# Patient Record
Sex: FEMALE | Race: WHITE | Employment: FULL TIME | URBAN - METROPOLITAN AREA
[De-identification: names, ages, dates, MRNs, and addresses within clinical notes are randomized per-mention and may not be internally consistent; named-entity substitution may affect disease eponyms.]

---

## 2021-05-07 ENCOUNTER — OFFICE VISIT (OUTPATIENT)
Dept: SURGERY | Age: 31
End: 2021-05-07
Payer: OTHER GOVERNMENT

## 2021-05-07 VITALS
OXYGEN SATURATION: 98 % | DIASTOLIC BLOOD PRESSURE: 80 MMHG | HEIGHT: 65 IN | HEART RATE: 114 BPM | SYSTOLIC BLOOD PRESSURE: 138 MMHG | TEMPERATURE: 97.6 F | BODY MASS INDEX: 48.82 KG/M2 | RESPIRATION RATE: 18 BRPM | WEIGHT: 293 LBS

## 2021-05-07 DIAGNOSIS — E66.01 MORBID OBESITY WITH BMI OF 60.0-69.9, ADULT (HCC): Primary | ICD-10-CM

## 2021-05-07 PROCEDURE — 99205 OFFICE O/P NEW HI 60 MIN: CPT | Performed by: SURGERY

## 2021-05-07 RX ORDER — FERROUS GLUCONATE 324(38)MG
324 TABLET ORAL DAILY
COMMUNITY
End: 2021-08-17

## 2021-05-07 RX ORDER — LEVOTHYROXINE SODIUM 125 UG/1
2 CAPSULE ORAL DAILY
COMMUNITY
Start: 2021-03-03

## 2021-05-07 NOTE — PROGRESS NOTES
Consult    Patient: Beulah Marquez MRN: [de-identified]  SSN: [id number]    YOB: 1990  Age: 27 y.o. Sex: female      Initial  Consultation for Bariatric Surgery     Beulah Marquez is a 74-year-old Y female who presents for discussion of the surgical options available for definitive management of her super, super obesity. Onset obesity: Childhood  Weight at age 24: 240 pounds on 5 four 5 inch frame  Maximum/current weight: 363 pounds  Pattern/progression of weight gain: Slowly progressive interrupted by dietary weight loss followed by regain of lost weight as well as additional weight thus exhibiting yoyo effect after current maximum weight of 363 pounds. Next medical weight loss attempts: Multiple supervised and and supervised weight loss trials with maximal loss occurring in 2019 losing 20 pounds over 3 months  Comorbidities: Prediabetes, Gastrosoft reflux disease, stricture incontinence, clinical obstructive sleep apnea, weight related arthropathyknees  Current weight: 363 pounds on a 5.5 inch frame with a body mass index of 61  Ideal body weight: 34  Excess body weight: 233  Postsurgical weight loss: 97  Estimated postsurgical goal weight: 180  Allergies: Latex, penicillin  Current medications: See medication list  Past medical history:  1. Super, super obesity with body mass index of 61 with obesity related comorbidities of prediabetes, Gastrosoft reflux disease, stricture incontinence, clinical obstructive sleep apnea, and related arthropathyknees  2. History of Hashimoto's thyroiditis at age 24 now hypothyroid  Past surgical history:  1. Tonsillectomy age 16  2. Allons tooth extraction 2019  Social history:  Denies utilization tobacco  Alcohol: Approximately 2 ounces monthly  Family history:   Mother 53adult onset diabetes mellitus,  Father 57healthy  Siblings x3healthy    Allergies   Allergen Reactions    Latex Itching    Pcn [Penicillins] Anaphylaxis       Current Outpatient Medications on File Prior to Visit   Medication Sig Dispense Refill    ferrous gluconate 324 mg (38 mg iron) tablet Take 324 mg by mouth.  Tirosint 125 mcg cap 2 Tabs daily. No current facility-administered medications on file prior to visit. Past Medical History:   Diagnosis Date    Borderline hypertension     Hypercholesterolemia     Prediabetes     Thyroid disease        Past Surgical History:   Procedure Laterality Date    HX HEENT      tonsillectomy       Social History     Tobacco Use    Smoking status: Never Smoker    Smokeless tobacco: Never Used   Substance Use Topics    Alcohol use: Yes     Frequency: Monthly or less    Drug use: Not Currently       Family History   Problem Relation Age of Onset    Diabetes Mother          Review of Systems:      General: Denies fevers, chills, night sweats, fatigue, weight loss, or weight gain. HEENT: Denies changes in auditory or visual acuity, recurrent pharyngitis, epistaxis, chronic rhinorrhea, vertigo    Respiratory: Denies increasing shortness of breath, productive cough, hemoptysis    Cardiac: Denies known history of cardiac disease, heart murmur, palpitations    GI: Denies dysphagia, recurrent emesis, hematemesis, changes in bowel habits, hematochezia, melena    : Denies hematuria frequency urgency dysuria    Musculoskeletal: Denies fractures, dislocations    Neurologic: Denies history of CVA, paralysis paresthesias, recurrent cephalgia, seizures    Endocrine: Denies polyuria, polydipsia, polyphagia, heat and cold intolerance    Lymph/heme: Denies a history of malignancy, anemia, bruising, blood transfusions    Integumentary: Negative for dermatitis         Physical Exam    Visit Vitals  /80   Pulse (!) 114   Temp 97.6 °F (36.4 °C)   Resp 18   Ht 5' 5\" (1.651 m)   Wt (!) 166.5 kg (367 lb)   SpO2 98%   BMI 61.07 kg/m²       Nursing note reviewed.      General: Clinically severely obese in no acute distress, nontoxic in appearance. Head: Normocephalic, atraumatic  Mouth: Clear, no overt lesions, oral mucosa is pink and moist.  Neck: Supple, no masses, no adenopathy or carotid bruits, trachea midline  Resp: Clear to auscultation bilaterally, no wheezing, rhonchi, or rales, excursions normal and symmetrical.  Cardio: Regular rate and rhythm, no murmurs, clicks, gallops, or rubs. Abdomen: Obese, soft, nontender, nondistended, normoactive bowel sounds, no hernias. Extremities: Warm, well perfused, no tenderness or swelling, normal gait/station, without edema or varicosities  Neuro: Sensation and strength grossly intact and symmetrical.  Psych: Alert and oriented to person, place, and time. Impression/Plan:    41-year-old white female with body mass index of 61 with obesity related comorbidities consisting of prediabetes, Gastrosoft reflux disease, stricture incontinence, clinical obstructive sleep apnea, weight related arthropathyknees who would benefit from bariatric surgery. We have had an extensive discussion with regard to the risks, benefits and likely outcomes of the operation. We've discussed the restrictive and malabsorptive nature of the gastric bypass and compared and contrasted with the sleeve gastrectomy. The patient understands the likelihood of losing approximately 80% of their excess weight in 12 to 18 months. The patient also understands the risks including but not limited to bleeding, infection, need for reoperation, ulcers, leaks and strictures, bowel obstruction secondary to adhesions and internal hernias, DVT, PE, heart attack, stroke, and death. Patient also understands risks of inadequate weight loss, excess weight loss, vitamin insufficiency, protein malnutrition, excess skin, and loss of hair.   We have reviewed the components of a successful postoperative course including requirement for a high protein, low carbohydrate diet, 60 oz a day of zero calorie liquids, daily vitamin supplementation, daily exercise, regular follow-up, and participation in support groups.  At this time we will enroll the patient in our bariatric program, undertake routine laboratory evaluation, chest X-ray, EKG, possible UGI and evaluation by  nutritionist as well as psychologist and pending their satisfactory completion of the preop evaluation, plan to pursue laparoscopic potentially open gastric bypass to achieve definitive durable weight loss on a personal level with expected resolution of obesity related comorbidities

## 2021-05-07 NOTE — PROGRESS NOTES
Chief Complaint   Patient presents with    Advice Only     confirmed video     Pt ID confirmed    Weight Loss Metrics 5/7/2021 5/7/2021   Pre op / Initial Wt 367 -   Today's Wt - 367 lb   BMI - 61.07 kg/m2   Ideal Body Wt 134 -   Excess Body Wt 233 -   Goal Wt 180 -   Wt loss to date 0 -   % Wt Loss 0 -   80% .4 -       Body mass index is 61.07 kg/m².

## 2021-05-11 ENCOUNTER — HOSPITAL ENCOUNTER (OUTPATIENT)
Dept: BARIATRICS/WEIGHT MGMT | Age: 31
Discharge: HOME | End: 2021-05-11

## 2021-05-11 ENCOUNTER — DOCUMENTATION ONLY (OUTPATIENT)
Dept: BARIATRICS/WEIGHT MGMT | Age: 31
End: 2021-05-11

## 2021-05-11 NOTE — PROGRESS NOTES
[address] Loss Madalyn JOHNATHAN Emmy [address] [facility], [address]    Patient's Name: Asha Parry   Age: 27 y.o. YOB: 1990   Sex: female    Date:   5/11/2021    Insurance:              Session: 1 of  3  Revision:     Surgeon:  Dr. Rojas Lerma    Height: 5 f 5   Weight:    367      Lbs. BMI: 61.2   Pounds Lost since last month: 0                 Pounds Gained since last month: 0    Starting Weight: 367     Previous Months Weight: 367  Overall Pounds Lost: 0   Overall Pounds Gained: 0    Do you smoke? None    Alcohol intake:  Number of drinks at a time:  1-3 glasses of wine a month  Number of times a week:     Class Guidelines    Guidelines are reviewed with patient at the start of every class. 1. Patient understands that weight loss trial classes must be consecutive. Patient understands if they miss a class, it is their responsibility to contact me to reschedule class. I will reach out to patient after their first no show. 2.  Patient understands the expectations that weight maintenance/weight loss is expected during the classes. Failure to demonstrate changes may result in one extra month of weight loss trial, followed by going back to see the surgeon. Patient understands that they CAN NOT gain any weight during the weight loss trial.  Gaining weight will result in extra classes. 3. Patient is also instructed to be doing their labs, blood work, psych visit, support group and any other test that the surgeon has used while they are working on their weight loss trial.  4.  Patient was instructed to bring their blue binder to every class and appointment. Other Pertinent Information:     Changes Made Since Last Class: n/a, first class    Eating Habits and Behaviors      Today in class we talked about the key diet principles.   We start off each class talking about these principles, which include cutting out liquid calories and focusing on water or other non-calorie, non-carbonated drinks. We also spent time talking about carbohydrates, including foods that have carbohydrates and the goal to keep daily carbohydrates under 100 grams per day. Patient was given ideas of meal and snack choices that are lower in carbohydrates and focus more on protein. Patient was encouraged to start trying protein shakes and was given a list of suggestions. The main topic of class today was: Portion Control. We reviewed in class a power point filled with tips on ways to control portions, including using smaller plates, boxing up portions at a restaurant before starting to eat, and not eating from the container, but rather portioning snacks into smaller bags. Patient's were encouraged to food journal, which helps increase awareness of what and how much they are eating. It was emphasized to patient the importance of reading labels and portion sizes, but also applying these portion sizes. Patient was given a list of items that can help to make portion control easier. For example, a deck of cards or a palm of a hand is a proper portion of meat, a fist is a cup or a proper serving of vegetables. Patient was given 10 tips to help with the portion control. Patient's current diet habits include: 2 meals per day. She states she is either skipping lunch or breakfast.  She states if she eats breakfast, it is typically something savory like dinner leftovers or soup. Lunch is fast food or chinese. DInner is some protein, meat, fried chicken, spaghetti. Patient states it is normally something from the [place]. \"  I have given her guidelines for carbohydrates and keeping those low. She states she is eating from a regular size plates. She states her portion of starchy foods are 2-4 cups depending on what it is. SHe states she is snacking on junk food all day. I have talked to her about these habits and have made suggestions for alternative snack options. She is drinking 36-72 ounces of soda per day. She is aware that is something she needs to discontinue. Physical Activity/Exercise    Comments:     Currently for exercise, patient is lifting weights 2 x a week, riding her bike for 30-60 minutes. She states she has completed in triathlons at this weight. .  Patient was given a list of ideas for activity and was encouraged to incorporate 30 minutes a day into their daily routine. Behavior Modification       Comments: In class, we also focused on the behavior aspects of weight management. This includes being a mindful eater and not eating in front of the TV. Patient is also encouraged to take 20 minutes to eat a meal and eat at a table. One goal for next month includes:  1. Cut out soda intake.        Claire Mancera RD  5/11/2021

## 2021-06-08 ENCOUNTER — HOSPITAL ENCOUNTER (OUTPATIENT)
Dept: BARIATRICS/WEIGHT MGMT | Age: 31
Discharge: HOME | End: 2021-06-08

## 2021-06-08 ENCOUNTER — DOCUMENTATION ONLY (OUTPATIENT)
Dept: BARIATRICS/WEIGHT MGMT | Age: 31
End: 2021-06-08

## 2021-06-08 NOTE — PROGRESS NOTES
[address] Loss Madalyn JOHNATHAN Emmy [address] [facility], [address]    Patient's Name: Binh Bee   Age: 27 y.o. YOB: 1990   Sex: female    Date:   6/8/2021    Insurance:            Session: 2 of  3  Revision:   Surgeon:  Dr. Lucila Harada    Height: 5 f 5 Weight:    356      Lbs. BMI: 59.3   Pounds Lost since last month: 11               Pounds Gained since last month: 0    Starting Weight: 367   Previous Months Weight: 367  Overall Pounds Lost: 11 Overall Pounds Gained: 0      Do you smoke? None    Alcohol intake:  Number of drinks at a time:  1-3 glasses of wine a month  Number of times a week:     Class Guidelines    Guidelines are reviewed with patient at the start of every class. 1. Patient understands that weight loss trial classes must be consecutive. Patient understands if they miss a class, it is their responsibility to contact me to reschedule class. I will reach out to patient after their first no show. 2.  Patient understands the expectations that weight maintenance/weight loss is expected during the classes. Failure to demonstrate changes may result in one extra month of weight loss trial, followed by going back to see the surgeon. Patient understands that they CAN NOT gain any weight during the weight loss trial.  Gaining weight will result in extra classes. 3. Patient is also instructed to be doing their labs, blood work, psych visit, support group and any other test that the surgeon has used while they are working on their weight loss trial.  4.  Patient was instructed to bring their blue binder to every class and appointment. Other Pertinent Information:     Changes Made Since Last Class: Patient states her biggest victory is that she stopped drinking soda. Eating smaller portions. Decreased carbohydrates and using protein instead.     Eating Habits and Behaviors    Today in class, I reviewed a power point that discussed Nutrition, Behavior, and Exercise changes to start working on. Some of the eating behaviors that we discussed included the importance of eating breakfast.  We talked about some of the reasons that people don't eat breakfast and food choices that would be appropriate. We also talked about avoiding liquid calories. Patient is encouraged to aim for 64 ounces of fluid per day and trying to get them from water, Crystal Light, sugar free drinks. We also talked about eating out options that are healthier. Patient was encouraged to always request sauces and dressings on the side and request a salad, broth-based soup, or vegetables in place of fries. Patient's current diet habits include: Patient is eating 3-4 x a day. She is grabbing a protein shake in the morning or a yogurt. Sometimes she may have eggs or turkey ojeda. LUnch is deli meat or low fat cottage cheese. Dinner is non starchy vegetables, full serving of protein. She states if she does snack, it is between lunch and dinner and may be peanut butter or vegetables with hummus. She states if she eats out, she is trying to split with her . She is drinking  ounces of water per day. Physical Activity/Exercise    Comments: We talked about exercise. Patient was given reasons of why exercise is so important and how that can help with their long-term success. I have encouraged patient to get a support system to help with the activity. Currently for activity, patient is lifting weights twice a week and doing yoga twice a week and riding her bike outside 30-60 minutes a few times a week. Behavior Modification       Comments: We also talked about behavior modifications. We talked about eating triggers, such as eating in front of the TV and solutions, such as making the TV a no eating zone. If patient is eating out out of emotion, food will only temporarily solve that.   Patient is encouraged to HALT and assess if they are eating because they are Hungry, or out of emotions: Anxious, Lonely, Tired, which the food will only temporarily solve. We also talked about ways to prevent relapse. Goals that patient wants to work on includes:  1. Meal prep for breakfast  2. Incorporate swimming into her activity.        Mayur Mayers 87 RD  6/8/2021

## 2021-07-07 ENCOUNTER — DOCUMENTATION ONLY (OUTPATIENT)
Dept: SURGERY | Age: 31
End: 2021-07-07

## 2021-07-07 ENCOUNTER — HOSPITAL ENCOUNTER (OUTPATIENT)
Dept: LAB | Age: 31
Discharge: HOME | End: 2021-07-07
Payer: OTHER GOVERNMENT

## 2021-07-07 DIAGNOSIS — E66.01 MORBID OBESITY WITH BMI OF 60.0-69.9, ADULT (HCC): ICD-10-CM

## 2021-07-07 PROCEDURE — 83013 H PYLORI (C-13) BREATH: CPT

## 2021-07-09 LAB — UREA BREATH TEST QL: NEGATIVE

## 2021-07-13 ENCOUNTER — HOSPITAL ENCOUNTER (OUTPATIENT)
Dept: BARIATRICS/WEIGHT MGMT | Age: 31
Discharge: HOME | End: 2021-07-13

## 2021-07-13 ENCOUNTER — DOCUMENTATION ONLY (OUTPATIENT)
Dept: BARIATRICS/WEIGHT MGMT | Age: 31
End: 2021-07-13

## 2021-07-13 NOTE — PROGRESS NOTES
[address] Loss Madalyn Booth [address]    Patient's Name: Tom Kenyon   Age: 27 y.o. YOB: 1990   Sex: female    Date:   7/13/2021    Insurance:              Session: 3 of  3  Revision:     Surgeon:  Dr. Gamaliel Crowley    Height: 5 f 5   Weight:    347      Lbs. BMI: 57.9   Pounds Lost since last month: 11                 Pounds Gained since last month: 0    Starting Weight: 367     Previous Months Weight: 356  Overall Pounds Lost: 20   Overall Pounds Gained: 0      Do you smoke? None    Alcohol intake:  Number of drinks at a time:  None  Number of times a week: None    Class Guidelines    Guidelines are reviewed with patient at the start of every class. 1. Patient understands that weight loss trial classes must be consecutive. Patient understands if they miss a class, it is their responsibility to contact me to reschedule class. I will reach out to patient after their first no show. 2.  Patient understands the expectations that weight maintenance/weight loss is expected during the classes. Failure to demonstrate changes may result in one extra month of weight loss trial, followed by going back to see the surgeon. Patient understands that they CAN NOT gain any weight during the weight loss trial.  Gaining weight will result in extra classes. 3. Patient is also instructed to be doing their labs, blood work, psych visit, support group and any other test that the surgeon has used while they are working on their weight loss trial.  4.  Patient was instructed to bring their blue binder to every class and appointment. Other Pertinent Information:     Changes Made Since Last Class: Cut out the starches and bread and crackers. Craving soda, but states she has not given into the cravings. Eating Habits and Behaviors    Today in class, we started talking about the key diet principles.   We first focused on stopping liquid calories. Patient was also educated on carbohydrates. Patient was instructed to start cutting out bread, rice, and pasta from the diet and start focusing more on meat and vegetables. I then gave a power point, which focused on Label Reading. In class, I gave patients a labels and we worked through a series of questions to help patients have a better understanding of label reading. Patient was instructed to review the serving size. Patient was encouraged to focus on protein and carbohydrates. We also did a few label reading activities to help the patient become more familiar with label reading. Patient's current diet habits include: Patient is eating 3 melas per day and 1 snack. She states she is trying to do more protein shakes, fruits, and vegetables. She states she is eating more protein at dinner time. She is snacking between lunch on peanut butter dip, vegetables with hummus or a protein bar. She states she has only eaten out 2 x in the last month. Physical Activity/Exercise    Comments: We talked about exercise. Patient was given reasons of why exercise is so important and how that can help with their long-term success. I have encouraged patient to get a support system to help with the activity. Currently for activity, patient is doing light weights twice a week. She is doing yoga 2 x a week. She is riding a bike 2 x a week. .      Behavior Modification       Comments:  Behavior modifications were reinforced. This included not eating in front of the TV, which could lead to bigger portions and eating when one is not hungry. We also talked about the importance of eating 3 meals per day. Patient was encouraged to food journal to keep their daily carbohydrates less than 30 grams per meal.      Goals that patient wants to work on includes:  1. Stay on the plan while on vacation.   [address], [facility] Billy 87 RD  7/13/2021

## 2021-07-16 ENCOUNTER — DOCUMENTATION ONLY (OUTPATIENT)
Dept: BARIATRICS/WEIGHT MGMT | Age: 31
End: 2021-07-16

## 2021-07-16 ENCOUNTER — HOSPITAL ENCOUNTER (OUTPATIENT)
Dept: LAB | Age: 31
Discharge: HOME | End: 2021-07-16
Payer: OTHER GOVERNMENT

## 2021-07-16 ENCOUNTER — HOSPITAL ENCOUNTER (OUTPATIENT)
Dept: BARIATRICS/WEIGHT MGMT | Age: 31
Discharge: HOME | End: 2021-07-16

## 2021-07-16 ENCOUNTER — HOSPITAL ENCOUNTER (OUTPATIENT)
Dept: GENERAL RADIOLOGY | Age: 31
Discharge: HOME | End: 2021-07-16
Payer: OTHER GOVERNMENT

## 2021-07-16 DIAGNOSIS — E66.01 MORBID OBESITY WITH BMI OF 60.0-69.9, ADULT (HCC): ICD-10-CM

## 2021-07-16 LAB
25(OH)D3 SERPL-MCNC: 20 NG/ML (ref 30–100)
ALBUMIN SERPL-MCNC: 3.8 G/DL (ref 3.4–5)
ALBUMIN/GLOB SERPL: 1.1 {RATIO} (ref 0.8–1.7)
ALP SERPL-CCNC: 89 U/L (ref 45–117)
ALT SERPL-CCNC: 45 U/L (ref 13–56)
ANION GAP SERPL CALC-SCNC: 8 MMOL/L (ref 3–18)
APPEARANCE UR: ABNORMAL
AST SERPL-CCNC: 56 U/L (ref 10–38)
ATRIAL RATE: 105 BPM
BACTERIA URNS QL MICRO: ABNORMAL /HPF
BILIRUB SERPL-MCNC: 0.6 MG/DL (ref 0.2–1)
BILIRUB UR QL: ABNORMAL
BUN SERPL-MCNC: 13 MG/DL (ref 7–18)
BUN/CREAT SERPL: 16 (ref 12–20)
CALCIUM SERPL-MCNC: 8.4 MG/DL (ref 8.5–10.1)
CALCULATED P AXIS, ECG09: 20 DEGREES
CALCULATED R AXIS, ECG10: -4 DEGREES
CALCULATED T AXIS, ECG11: 12 DEGREES
CHLORIDE SERPL-SCNC: 103 MMOL/L (ref 100–111)
CHOLEST SERPL-MCNC: 256 MG/DL
CO2 SERPL-SCNC: 26 MMOL/L (ref 21–32)
COLOR UR: ABNORMAL
CREAT SERPL-MCNC: 0.82 MG/DL (ref 0.6–1.3)
DIAGNOSIS, 93000: NORMAL
EPITH CASTS URNS QL MICRO: ABNORMAL /LPF (ref 0–5)
ERYTHROCYTE [DISTWIDTH] IN BLOOD BY AUTOMATED COUNT: 14.7 % (ref 11.6–14.5)
FERRITIN SERPL-MCNC: 122 NG/ML (ref 8–388)
FOLATE SERPL-MCNC: 18.5 NG/ML (ref 3.1–17.5)
GLOBULIN SER CALC-MCNC: 3.6 G/DL (ref 2–4)
GLUCOSE SERPL-MCNC: 129 MG/DL (ref 74–99)
GLUCOSE UR STRIP.AUTO-MCNC: NEGATIVE MG/DL
HCT VFR BLD AUTO: 43.4 % (ref 35–45)
HDLC SERPL-MCNC: 51 MG/DL (ref 40–60)
HDLC SERPL: 5 {RATIO} (ref 0–5)
HGB BLD-MCNC: 13.7 G/DL (ref 12–16)
HGB UR QL STRIP: ABNORMAL
IRON SERPL-MCNC: 51 UG/DL (ref 50–175)
KETONES UR QL STRIP.AUTO: 15 MG/DL
LDLC SERPL CALC-MCNC: 165.8 MG/DL (ref 0–100)
LEUKOCYTE ESTERASE UR QL STRIP.AUTO: ABNORMAL
LIPID PROFILE,FLP: ABNORMAL
MCH RBC QN AUTO: 26.8 PG (ref 24–34)
MCHC RBC AUTO-ENTMCNC: 31.6 G/DL (ref 31–37)
MCV RBC AUTO: 84.9 FL (ref 74–97)
NITRITE UR QL STRIP.AUTO: NEGATIVE
P-R INTERVAL, ECG05: 150 MS
PH UR STRIP: 5.5 [PH] (ref 5–8)
PLATELET # BLD AUTO: 420 K/UL (ref 135–420)
PMV BLD AUTO: 10.6 FL (ref 9.2–11.8)
POTASSIUM SERPL-SCNC: 4.2 MMOL/L (ref 3.5–5.5)
PROT SERPL-MCNC: 7.4 G/DL (ref 6.4–8.2)
PROT UR STRIP-MCNC: ABNORMAL MG/DL
Q-T INTERVAL, ECG07: 348 MS
QRS DURATION, ECG06: 86 MS
QTC CALCULATION (BEZET), ECG08: 459 MS
RBC # BLD AUTO: 5.11 M/UL (ref 4.2–5.3)
RBC #/AREA URNS HPF: ABNORMAL /HPF (ref 0–5)
SODIUM SERPL-SCNC: 137 MMOL/L (ref 136–145)
SP GR UR REFRACTOMETRY: 1.03 (ref 1–1.03)
TRIGL SERPL-MCNC: 196 MG/DL (ref ?–150)
TSH SERPL DL<=0.05 MIU/L-ACNC: 0.71 UIU/ML (ref 0.36–3.74)
UROBILINOGEN UR QL STRIP.AUTO: 1 EU/DL (ref 0.2–1)
VENTRICULAR RATE, ECG03: 105 BPM
VIT B12 SERPL-MCNC: 396 PG/ML (ref 211–911)
VLDLC SERPL CALC-MCNC: 39.2 MG/DL
WBC # BLD AUTO: 11.7 K/UL (ref 4.6–13.2)
WBC URNS QL MICRO: ABNORMAL /HPF (ref 0–4)

## 2021-07-16 PROCEDURE — 82306 VITAMIN D 25 HYDROXY: CPT

## 2021-07-16 PROCEDURE — 83540 ASSAY OF IRON: CPT

## 2021-07-16 PROCEDURE — 81001 URINALYSIS AUTO W/SCOPE: CPT

## 2021-07-16 PROCEDURE — 84425 ASSAY OF VITAMIN B-1: CPT

## 2021-07-16 PROCEDURE — 71046 X-RAY EXAM CHEST 2 VIEWS: CPT

## 2021-07-16 PROCEDURE — 36415 COLL VENOUS BLD VENIPUNCTURE: CPT

## 2021-07-16 PROCEDURE — 93005 ELECTROCARDIOGRAM TRACING: CPT

## 2021-07-16 PROCEDURE — 80053 COMPREHEN METABOLIC PANEL: CPT

## 2021-07-16 PROCEDURE — 80061 LIPID PANEL: CPT

## 2021-07-16 PROCEDURE — 84443 ASSAY THYROID STIM HORMONE: CPT

## 2021-07-16 PROCEDURE — 82728 ASSAY OF FERRITIN: CPT

## 2021-07-16 PROCEDURE — 82607 VITAMIN B-12: CPT

## 2021-07-16 PROCEDURE — 85027 COMPLETE CBC AUTOMATED: CPT

## 2021-07-16 NOTE — PROGRESS NOTES
Nutrition Evaluation    Patient's Name: Fay Ortiz   Age: 27 y.o. YOB: 1990   Sex: female    Height: 5 f 5 Weight: 345 BMI:  57.5  Starting Weight:  367        Smoking Status:  None  Alcohol Intake:  Number of Drinks at a Time: None  Number of Times a Week: None    Changes made during classes include:  Cut out her carbohydrates  Lowered her sugar intake  Increased her water intake  Walking    Summary:  I feel that Fay Ortiz has demonstrated appropriate diet changes and is ready to move forward with surgery. Patient has been briefed on the importance of the protein drinks, vitamins, and the transition of the diet stages. Patient understands that the long-term diet will focus on protein and vegetables. Patient understand the effects of carbohydrates after surgery and what reactive hypoglycemia is. Patient is aware that they will be attending pre-op class 2 weeks before surgery and will get more detailed information on the post-op diet guidelines. Patient will see me again at 6 weeks post-op. At this 6 week visit, RD will assess how patient is tolerating soft protein and advance to vegetables, if tolerating soft protein without difficulty. Patient will also see RD again at 9 months post-op. This visit will assess patient's compliance with current protocol, including diet, vitamins, protein shakes, and exercise. Post-op diet guidelines will be reinforced. RD is available for questions and to meet with patient outside of the 6 week and 9 month post-op visit. We spent a lot of time talking about the vitamins. Patient understands the importance of being compliant with the diet protocol and the complications and risks that can occur if they are non-compliant with the nutritional protocol. Patient has attended at least one support group.     Candidate for surgery: Yes  Re-evaluation Date:     Procedure:  Gastric [address], [place] RD  7/16/2021

## 2021-07-19 DIAGNOSIS — E66.01 MORBID OBESITY WITH BMI OF 60.0-69.9, ADULT (HCC): ICD-10-CM

## 2021-07-19 DIAGNOSIS — Z01.818 PRE-OP TESTING: Primary | ICD-10-CM

## 2021-07-20 ENCOUNTER — HOSPITAL ENCOUNTER (OUTPATIENT)
Dept: LAB | Age: 31
Discharge: HOME | End: 2021-07-20
Payer: OTHER GOVERNMENT

## 2021-07-20 ENCOUNTER — TELEPHONE (OUTPATIENT)
Dept: SURGERY | Age: 31
End: 2021-07-20

## 2021-07-20 DIAGNOSIS — Z01.818 PRE-OP TESTING: ICD-10-CM

## 2021-07-20 DIAGNOSIS — E66.01 MORBID OBESITY WITH BMI OF 60.0-69.9, ADULT (HCC): ICD-10-CM

## 2021-07-20 DIAGNOSIS — R82.90 ABNORMAL URINALYSIS: Primary | ICD-10-CM

## 2021-07-20 LAB
ATRIAL RATE: 86 BPM
CALCULATED P AXIS, ECG09: 49 DEGREES
CALCULATED R AXIS, ECG10: 45 DEGREES
CALCULATED T AXIS, ECG11: 25 DEGREES
DIAGNOSIS, 93000: NORMAL
P-R INTERVAL, ECG05: 168 MS
Q-T INTERVAL, ECG07: 356 MS
QRS DURATION, ECG06: 90 MS
QTC CALCULATION (BEZET), ECG08: 426 MS
VENTRICULAR RATE, ECG03: 86 BPM
VIT B1 BLD-SCNC: 138.7 NMOL/L (ref 66.5–200)

## 2021-07-20 PROCEDURE — 93005 ELECTROCARDIOGRAM TRACING: CPT

## 2021-07-22 ENCOUNTER — OFFICE VISIT (OUTPATIENT)
Dept: SURGERY | Age: 31
End: 2021-07-22
Payer: OTHER GOVERNMENT

## 2021-07-22 VITALS
DIASTOLIC BLOOD PRESSURE: 85 MMHG | HEIGHT: 65 IN | WEIGHT: 293 LBS | HEART RATE: 109 BPM | RESPIRATION RATE: 20 BRPM | BODY MASS INDEX: 48.82 KG/M2 | TEMPERATURE: 99.1 F | SYSTOLIC BLOOD PRESSURE: 146 MMHG

## 2021-07-22 DIAGNOSIS — E66.01 MORBID OBESITY WITH BMI OF 50.0-59.9, ADULT (HCC): Primary | ICD-10-CM

## 2021-07-22 PROCEDURE — 99214 OFFICE O/P EST MOD 30 MIN: CPT | Performed by: SURGERY

## 2021-07-22 RX ORDER — SPIRONOLACTONE 50 MG/1
TABLET, FILM COATED ORAL DAILY
COMMUNITY
End: 2021-08-17

## 2021-07-22 RX ORDER — GLUCOSAMINE SULFATE 1500 MG
POWDER IN PACKET (EA) ORAL DAILY
COMMUNITY
End: 2021-08-06

## 2021-07-22 NOTE — PROGRESS NOTES
Preop History and Physical Exam:    Prabhakar Ortiz is a 27 y.o. white female initially evaluated in this office on 7 May 2021 for discussion of the surgical options available for the definitive management of her clinically severe obesity. The patient at that time weighed 363 pounds with a body mass index of 61 with obesity related comorbidities of hypertriglyceridemia, hypercholesterolemia, prediabetes, gastroesophageal reflux disease, stress urinary incontinence, clinical obstructive sleep apnea, polycystic ovarian syndrome, weight related arthropathy. The patient after discussing surgical options has elected to pursue laparoscopic Trever-en-Y gastric bypass to achieve definitive durable weight loss on a personal level expected resolution of obesity related comorbidities. The patient returns today after completing all bariatric surgical multidisciplinary programmatic requirements necessary prior to the pursuit of surgery for discussion of the diagnostic evaluation and surgical scheduling noting no new medical or surgical history. The patient currently weighs 352 pounds on a 5 to 5 inch frame with a body mass index of 58 with obesity related comorbidities of hypertriglyceridemia, hypercholesterolemia, prediabetes, gastroesophageal reflux disease, stress urinary incontinence, clinical obstructive sleep apnea, polycystic ovarian syndrome, weight related arthropathy. Ideal body weight 134 pounds, excess body weight 218 pounds, estimated postsurgical weight loss based on 80% loss of excess body weight 174 pounds, postsurgical goal weight 178 pounds. Past Medical History:   Diagnosis Date    Borderline hypertension     Hypercholesterolemia     Prediabetes     Thyroid disease        Past Surgical History:   Procedure Laterality Date    HX HEENT      tonsillectomy       Current Outpatient Medications   Medication Sig Dispense Refill    spironolactone (ALDACTONE) 50 mg tablet Take  by mouth daily.       cholecalciferol (Vitamin D3) 25 mcg (1,000 unit) cap Take  by mouth daily.  ferrous gluconate 324 mg (38 mg iron) tablet Take 324 mg by mouth.  Tirosint 125 mcg cap 2 Tabs daily. Allergies   Allergen Reactions    Latex Itching    Pcn [Penicillins] Anaphylaxis       Social History     Tobacco Use    Smoking status: Never Smoker    Smokeless tobacco: Never Used   Substance Use Topics    Alcohol use: Yes    Drug use: Not Currently       Family History   Problem Relation Age of Onset    Diabetes Mother        Review of Systems:  Positive in BOLD    CONST: Fever, weight loss, fatigue or chills  GI: Nausea, vomiting, abdominal pain, change in bowel habits, hematochezia, melena, and GERD   INTEG: Dermatitis, abnormal moles  HEENT: Recent changes in vision, vertigo, epistaxis, dysphagia and hoarseness  CV: Chest pain, palpitations, HTN, edema and varicosities  RESP: Cough, shortness of breath, wheezing, hemoptysis, snoring and reactive airway disease  : Hematuria, dysuria, frequency, urgency, nocturia and stress urinary incontinence   MS: Weakness, joint pain and arthritis  ENDO: Diabetes, thyroid disease, polyuria, polydipsia, polyphagia, poor wound healing, heat intolerance, cold intolerance  LYMPH/HEME: Anemia, bruising and history of blood transfusions  NEURO: Dizziness, headache, fainting, seizures and stroke  PSYCH: Anxiety and depression    Physical Exam    Visit Vitals  BP (!) 146/85 (BP 1 Location: Left upper arm, BP Patient Position: At rest, BP Cuff Size: Adult)   Pulse (!) 109   Temp 99.1 °F (37.3 °C) (Oral)   Resp 20   Ht 5' 5\" (1.651 m)   Wt (!) 159.7 kg (352 lb)   BMI 58.58 kg/m²         General: Well developed, well nourished 27 y.o.) female in no acute distress  Head: Normocephalic, atraumatic  Resp: Clear to auscultation bilaterally, now wheeze, rhonchi, or rales, excursions normal and symmetrical  Cardio: Regular rate and rhythm, no murmurs, clicks, gallops, or rubs.  No edema or varicosities. Abdomen: Obese, soft, nontender, nondistended, normoactive bowel sounds, no hernias, no hepatosplenomegaly,  Psych: Alert and oriented to person, place, and time. July 16, 2021 CBC, CMP, cholesterol panel, TSH, urinalysis, vitamin B1, vitamin B12, folate, iron, vitamin D, H. pylori serology: Moderate leukocyte esterase, large blood in the urine, glucose 129, SGOT 56, folate 18.5, cholesterol 256, triglycerides 196, vitamin D 20 with remainder laboratory profile within normal parameters. The patient was begun on supplemental vitamin D at the time of receipt of her laboratory profile  June 9, 2021 Pap smear: No evidence of malignancy  May 7, 2021 chest x-ray: No active cardiopulmonary disease  July 16, 2021 bariatric nutrition/Moreno: Concurring with proceed with surgery  July 16, 2021 psychology/Yi: Concurring with pursuit of surgery  July 20, 2021 EKG: Normal sinus rhythm with rate of 86normal EKG    Impression:    Nuno Cross is a 27 y.o. white female with a body mass index of 58 with obesity related comorbidities of hypercholesterolemia, hypertriglyceridemia, prediabetes, Gastrosoft reflux disease, stress urinary incontinence, clinical obstructive sleep apnea, polycystic ovarian syndrome, and weight related arthropathy who would benefit from bariatric surgery. We've discussed the restrictive and malabsorptive nature of the gastric bypass and compared and contrasted with the sleeve gastrectomy. The patient understands the likelihood of losing approximately 80% of their excess weight in 12 to 18 months. She also understands the risks including but not limited to bleeding, infection, need for reoperation, anastomotic ulcers, leaks and strictures, bowel obstruction secondary to adhesions and internal hernias, DVT, PE, heart attack, stroke, and death.  Patient also understands risks of inadequate weight loss, excess weight loss, vitamin insufficiency, protein malnutrition, excess skin, and loss of hair. We have reviewed the components of a successful postoperative course including requirement for a high protein, low carbohydrate diet, 60 oz a day of zero calorie liquids, daily vitamin supplementation, daily exercise, regular follow-up, and participation in support groups. We have reviewed the preoperative liver shrinking clear liquid diet, as well as reviewed any medication changes required while on the clear liquid diet. In addition, the patient understands that all medications to be taken during the first 8 weeks postoperatively can be taken whole as long as the medication is approximately the size of a Sunny 325 mg aspirin tablet in size. The patient further understands that it is his/her responsibility to review these and verify with their primary care doctor and pharmacist that all medications are of the appropriate size.   We will schedule the patient for laparoscopic gastric bypass  in the near future after obtaining a repeat urinalysis

## 2021-07-22 NOTE — PROGRESS NOTES
Leelee Hidalgo is a 27 y.o. female who presents today with. Body mass index is 58.58 kg/m². 1. Have you been to the ER, urgent care clinic since your last visit? Hospitalized since your last visit? No    2. Have you seen or consulted any other health care providers outside of the [address] [place] [address] since your last visit? Include any pap smears or colon screening.  No

## 2021-07-22 NOTE — H&P (VIEW-ONLY)
Preop History and Physical Exam:    Soniya Lynch is a 27 y.o. white female initially evaluated in this office on 7 May 2021 for discussion of the surgical options available for the definitive management of her clinically severe obesity. The patient at that time weighed 363 pounds with a body mass index of 61 with obesity related comorbidities of hypertriglyceridemia, hypercholesterolemia, prediabetes, gastroesophageal reflux disease, stress urinary incontinence, clinical obstructive sleep apnea, polycystic ovarian syndrome, weight related arthropathy. The patient after discussing surgical options has elected to pursue laparoscopic Trever-en-Y gastric bypass to achieve definitive durable weight loss on a personal level expected resolution of obesity related comorbidities. The patient returns today after completing all bariatric surgical multidisciplinary programmatic requirements necessary prior to the pursuit of surgery for discussion of the diagnostic evaluation and surgical scheduling noting no new medical or surgical history. The patient currently weighs 352 pounds on a 5 to 5 inch frame with a body mass index of 58 with obesity related comorbidities of hypertriglyceridemia, hypercholesterolemia, prediabetes, gastroesophageal reflux disease, stress urinary incontinence, clinical obstructive sleep apnea, polycystic ovarian syndrome, weight related arthropathy. Ideal body weight 134 pounds, excess body weight 218 pounds, estimated postsurgical weight loss based on 80% loss of excess body weight 174 pounds, postsurgical goal weight 178 pounds. Past Medical History:   Diagnosis Date    Borderline hypertension     Hypercholesterolemia     Prediabetes     Thyroid disease        Past Surgical History:   Procedure Laterality Date    HX HEENT      tonsillectomy       Current Outpatient Medications   Medication Sig Dispense Refill    spironolactone (ALDACTONE) 50 mg tablet Take  by mouth daily.       cholecalciferol (Vitamin D3) 25 mcg (1,000 unit) cap Take  by mouth daily.  ferrous gluconate 324 mg (38 mg iron) tablet Take 324 mg by mouth.  Tirosint 125 mcg cap 2 Tabs daily. Allergies   Allergen Reactions    Latex Itching    Pcn [Penicillins] Anaphylaxis       Social History     Tobacco Use    Smoking status: Never Smoker    Smokeless tobacco: Never Used   Substance Use Topics    Alcohol use: Yes    Drug use: Not Currently       Family History   Problem Relation Age of Onset    Diabetes Mother        Review of Systems:  Positive in BOLD    CONST: Fever, weight loss, fatigue or chills  GI: Nausea, vomiting, abdominal pain, change in bowel habits, hematochezia, melena, and GERD   INTEG: Dermatitis, abnormal moles  HEENT: Recent changes in vision, vertigo, epistaxis, dysphagia and hoarseness  CV: Chest pain, palpitations, HTN, edema and varicosities  RESP: Cough, shortness of breath, wheezing, hemoptysis, snoring and reactive airway disease  : Hematuria, dysuria, frequency, urgency, nocturia and stress urinary incontinence   MS: Weakness, joint pain and arthritis  ENDO: Diabetes, thyroid disease, polyuria, polydipsia, polyphagia, poor wound healing, heat intolerance, cold intolerance  LYMPH/HEME: Anemia, bruising and history of blood transfusions  NEURO: Dizziness, headache, fainting, seizures and stroke  PSYCH: Anxiety and depression    Physical Exam    Visit Vitals  BP (!) 146/85 (BP 1 Location: Left upper arm, BP Patient Position: At rest, BP Cuff Size: Adult)   Pulse (!) 109   Temp 99.1 °F (37.3 °C) (Oral)   Resp 20   Ht 5' 5\" (1.651 m)   Wt (!) 159.7 kg (352 lb)   BMI 58.58 kg/m²         General: Well developed, well nourished 27 y.o.) female in no acute distress  Head: Normocephalic, atraumatic  Resp: Clear to auscultation bilaterally, now wheeze, rhonchi, or rales, excursions normal and symmetrical  Cardio: Regular rate and rhythm, no murmurs, clicks, gallops, or rubs.  No edema or varicosities. Abdomen: Obese, soft, nontender, nondistended, normoactive bowel sounds, no hernias, no hepatosplenomegaly,  Psych: Alert and oriented to person, place, and time. July 16, 2021 CBC, CMP, cholesterol panel, TSH, urinalysis, vitamin B1, vitamin B12, folate, iron, vitamin D, H. pylori serology: Moderate leukocyte esterase, large blood in the urine, glucose 129, SGOT 56, folate 18.5, cholesterol 256, triglycerides 196, vitamin D 20 with remainder laboratory profile within normal parameters. The patient was begun on supplemental vitamin D at the time of receipt of her laboratory profile  June 9, 2021 Pap smear: No evidence of malignancy  May 7, 2021 chest x-ray: No active cardiopulmonary disease  July 16, 2021 bariatric nutrition/Moreno: Concurring with proceed with surgery  July 16, 2021 psychology/Yi: Concurring with pursuit of surgery  July 20, 2021 EKG: Normal sinus rhythm with rate of 86normal EKG    Impression:    Annalisa Tripp is a 27 y.o. white female with a body mass index of 58 with obesity related comorbidities of hypercholesterolemia, hypertriglyceridemia, prediabetes, Gastrosoft reflux disease, stress urinary incontinence, clinical obstructive sleep apnea, polycystic ovarian syndrome, and weight related arthropathy who would benefit from bariatric surgery. We've discussed the restrictive and malabsorptive nature of the gastric bypass and compared and contrasted with the sleeve gastrectomy. The patient understands the likelihood of losing approximately 80% of their excess weight in 12 to 18 months. She also understands the risks including but not limited to bleeding, infection, need for reoperation, anastomotic ulcers, leaks and strictures, bowel obstruction secondary to adhesions and internal hernias, DVT, PE, heart attack, stroke, and death.  Patient also understands risks of inadequate weight loss, excess weight loss, vitamin insufficiency, protein malnutrition, excess skin, and loss of hair. We have reviewed the components of a successful postoperative course including requirement for a high protein, low carbohydrate diet, 60 oz a day of zero calorie liquids, daily vitamin supplementation, daily exercise, regular follow-up, and participation in support groups. We have reviewed the preoperative liver shrinking clear liquid diet, as well as reviewed any medication changes required while on the clear liquid diet. In addition, the patient understands that all medications to be taken during the first 8 weeks postoperatively can be taken whole as long as the medication is approximately the size of a Sunny 325 mg aspirin tablet in size. The patient further understands that it is his/her responsibility to review these and verify with their primary care doctor and pharmacist that all medications are of the appropriate size.   We will schedule the patient for laparoscopic gastric bypass  in the near future after obtaining a repeat urinalysis

## 2021-08-03 ENCOUNTER — TELEPHONE (OUTPATIENT)
Dept: MEDSURG UNIT | Age: 31
End: 2021-08-03

## 2021-08-03 ENCOUNTER — HOSPITAL ENCOUNTER (OUTPATIENT)
Dept: BARIATRICS/WEIGHT MGMT | Age: 31
Discharge: HOME | End: 2021-08-03

## 2021-08-03 ENCOUNTER — DOCUMENTATION ONLY (OUTPATIENT)
Dept: BARIATRICS/WEIGHT MGMT | Age: 31
End: 2021-08-03

## 2021-08-03 RX ORDER — ENOXAPARIN SODIUM 100 MG/ML
40 INJECTION SUBCUTANEOUS DAILY
Qty: 7 SYRINGE | Refills: 0 | Status: SHIPPED | OUTPATIENT
Start: 2021-08-16 | End: 2021-08-26

## 2021-08-03 NOTE — PROGRESS NOTES
CLINICAL NUTRITION PRE-OPERATIVE EDUCATION    Patient's Name: Kim Martin   Age: 27 y.o. YOB: 1990   Sex: female    Education & Materials Provided:   - Soft Protein Diet Shopping List  -  Supplemental Resource Guide: MVI, B12, Calcium Citrate, Vitamin D, Vitamin B1, and iron recommendations  - Protein Supplement Information  - Fluid Requirements/ No Straws  - No Caffeine or Carbonation   - No alcohol              - No Snacks or No Concentrated Sweets     - Exercising   - Support System at [facility] of Support Group meetings. Support System after surgery includes: x     - Key Diet Principles            - Addressed Current Habits/Changes to Make   - Patient has been educated on the liquid diet to begin 1 week prior to surgery. Patient understands the transition of the diet. Attendance of support group: Yes    Summary:  Patient has completed the required amount of visits with the Registered Dietitian. During these nutrition visits, we focused on dietary changes, behavior changes, and the importance of establishing an exercise routine. The diet protocol that patient was prescribed emphasized on low carbohydrates (less than 100 grams per day prior to surgery and 60-80 grams of protein per day). At today's session, patient was educated on the post-op diet protocol. Patient understands the importance of keeping total fat and sugar less than 3 grams per serving. Patient is aware of the transition of the diet stages and is aware that they will be on clear liquids for 7days, followed by soft protein for 5 weeks. Patient understands the body needs ~ 60-70 grams of protein per day. During the liquid phase, patient will need 60 grams of protein from shakes. Once eating soft protein, patient will only need 1 shake per day. Patient is aware of the importance of the vitamins and protein drinks. We spent a lot of time talking about the vitamins.   Patient understands the importance of being compliant with the diet protocol and the complications and risks that can occur if they are non-compliant with the nutritional protocol and non-compliant with the vitamins. Patient has also been educated on the pre-op liquid diet for 1 week. Patient understands that failure to comply in pre-op liquid diet could result in surgery being canceled. Patient's 6 week post-op nutrition appointment has been scheduled. At this 6 week visit, RD will assess how patient is tolerating soft protein and advance to vegetables, if tolerating soft protein without difficulty. Patient will also see RD again at 9 months post-op. This visit will assess patient's compliance with current protocol, including diet, vitamins, protein shakes, and exercise. Post-op diet guidelines will be reinforced. RD is available for questions and to meet with patient outside of the 6 week and 9 month post-op visit. Ok to proceed.      Candidate for surgery: Yes  Re-evaluation Date:     Jim Brown RD  8/3/2021

## 2021-08-03 NOTE — TELEPHONE ENCOUNTER
Gastric Bypass Instruct patient to read and understand how their surgery works. The laparoscopic Trever-en-Y Gastric Bypass -- often called gastric  bypass -- is considered the gold standard of weight loss surgery. The procedure: The gastric bypass is one of the most frequently performed weight  loss procedures in the [place]. In this procedure, stapling  creates a small (15 to 20 milliliters) stomach pouch. The remainder  of the stomach is not removed but is completely stapled shut and divided from the stomach  pouch. The outlet from this newly formed pouch empties directly into the lower portion of the  small intestine, thus bypassing calorie absorption. This is done by dividing the small intestine just  beyond the duodenum for the purpose of bringing it up and constructing a connection with the  newly formed stomach pouch. The other end is connected into the side of the Trever limb of the  intestine creating the Y shape that gives the technique its name. The length of either segment of  the intestine can be increased to produce lower or higher levels of malabsorption. Most importantly, the rerouting of the food stream produces changes in gut hormones that  promote feeling of fullness, suppress hunger, and reverse one of the primary mechanisms by which  obesity induces Type 2 diabetes. Advantages:   The average excess weight loss after the gastric bypass procedure is generally higher in a  compliant patient than with purely restrictive procedures.  One year after surgery, weight loss can average 60 to 80 percent of excess body weight.  Studies show that after 10 to 14 years, 50 to 60 percent of excess body weight loss has been  maintained by some patients.  A 2000 study of 500 patients showed that 96 percent of associated health conditions (back  pain, sleep apnea, high blood pressure, diabetes and depression) were improved or resolved.   Disadvantages:   Because the duodenum is bypassed, poor absorption of iron and calcium can result in the  lowering of total body iron and a predisposition to iron deficiency anemia.  A chronic anemia caused by vitamin B-12 deficiency may occur. This problem usually can be  prevented with vitamin B-12 pills under the tongue or injections.  In some cases, the effectiveness of the procedure may be reduced if the stomach pouch is  stretched and/or if it is initially left larger than 15 to 30 cc.  The bypassed portion of the stomach, duodenum and segments of the small intestine cannot  be easily visualized using X-ray or endoscopy if there are any problems, such as ulcers,  bleeding or malignancy. Sleeve Gastrectomy  The laparoscopic sleeve gastrectomy -- often called the  sleeve -- is performed by removing approximately 80 percent  of the stomach. The remaining stomach is a tubular pouch that  resembles a banana. The procedure:  During the sleeve gastrectomy procedure, a thin, vertical sleeve  of stomach is created by using a stapling device. The sleeve is about the size of a banana. The rest  of the stomach is removed. By creating a smaller stomach pouch, a sleeve gastrectomy limits the  amount of food that can be eaten at one time so you feel full sooner and stay full longer. As you  eat less food, your body will stop storing excess calories and start using its fat supply for energy. The greater impact, however, seems to be the effect the surgery has on gut hormones that impact  a number of factors including hunger, feeling of fullness, and blood sugar control. Advantages:   Eliminates the portion of the stomach that produces the hormones that stimulate hunger.  Minimizes the chance of an ulcer occurring.  Is an appealing option for people who are concerned about the complications of intestinal  bypass procedures or who have anemia, Crohns disease or various other conditions that make  intestinal bypass procedures too risky.   -- 13 --  PATIENT GUIDE TO Drake Henson  Disadvantages:   Potential for inadequate weight loss or weight regain. While this is true for all procedures, it is  more possible with procedures that do not have an intestinal bypass.  Higher BMI patients may need to have a second-stage procedure later to help lose additional  weight. Remember, two stages may ultimately be safer and more effective than one operation  for higher BMI patients.  This procedure does involve stomach stapling and therefore, leaks and other complications  related to stapling may occur.  This procedure is not reversible. Pre op Appoitments  PE LOCATION PROVIDER  2 Weeks  6-Week Nutrition  3 Months*  6 Months*  1 Year*  Patient Acknowledgement of Risks, Benefits,  and Alternatives to Bariatric Surgical Procedures  Patient Name:_________________________________________________________________  :_ _______________________________________________________________________  I am requesting bariatric surgery be performed on me. I believe I may benefit from bariatric  surgery. This form is designed to ensure that I understand the risks, benefits, and alternatives to  having bariatric surgery. Bariatric surgery, or surgery for morbid obesity, is major surgery. The  options available include restrictive procedures, or those that limit digestive capacity. Examples  include vertical sleeve gastrectomy, or the adjustable gastric band (Lap-Band¢ç/Realize). Malabsorptive procedures, such as distal gastric bypass produce weight loss by decreasing nutrient  absorption. Biliopancreatic diversion with duodenal switch (BPD/DS) and Trever-en-Y gastric  bypass combine these two processes to varying degrees. While most procedures can be performed  laparoscopically (through multiple small incisions), there is a possibility that an open technique  may be required (through a large incision).  There are alternatives to surgery including medications,  diet and exercise, and behavior modification. I understand that obesity is a chronic disease with no  known cure. I am choosing bariatric surgery as treatment for this disease. Patient initials: ______________  I am informed of the potential benefits from bariatric surgery which may include improvements  in associated comorbidities (ie; diabetes, obstructive sleep apnea, GERD, high blood pressure),  potential weight loss of 50-80 percent excess weight, and general improvement in quality of life. The benefits are not guaranteed, and are dependent upon me making the necessary lifestyle  changes for success. I am aware that some patients may not lose as much weight, or still may  require treatment for medical problems after bariatric surgery. Some patients may gain back some  or all of the weight lost after bariatric surgery. Bariatric surgery is a treatment tool, not a cure for  obesity. Compliance with the dietary and lifestyle recommendations is necessary for maintenance  of lost weight in the long term. For example, I have been taught that it is recommended that  all patients maintain a healthy diet consisting of low-carbohydrate, low-sugar foods rich in lean  protein, and non-starchy vegetables. Regular exercise including aerobic activity and weight  training is encouraged, as well as regular attendance at support group meetings. Patient initials: ______________  -- 23 --  PATIENT GUIDE TO Drake Henson  Eliminating habits that could be detrimental to my health such as drinking alcohol or smoking  is required for all patients. I am aware that the risks of smoking and alcohol use after bariatric  surgery include anesthesia complications, stomach ulcers, liver diseases and malnutrition.   In addition, research has shown an increase in sensitivity to alcohol particularly after gastric bypass  procedures resulting in rapid increases in blood alcohol levels and possible addiction. Patient initials: ______________  Because bariatric surgery is considered major surgery, there are many potential complications that  could arise. Some of the problems are related to the bariatric procedure itself, while others are  related to anesthesia and operating on the abdomen. I understand the serious potential complications include: deep venous thrombosis/pulmonary  embolism (blood clots in the legs and or lungs); gastrointestinal leak (leakage of digestive contents  into the abdomen); sepsis (serious infection); injury to adjacent organs such as esophagus, spleen,  pancreas, liver, diaphragm (requiring intervention or surgical removal); excessive bleeding; bowel  obstruction (blockage of the intestines); or organ failure. I am informed that these complications  can be life threatening. The overall mortality rate (risk of death) from bariatric surgery is close to  0.1 percent (1/1,000), but can be as high as 0.5 percent (1/200) for some patients. Patient initials: ______________  I understand that complications requiring re-operation may occur, either immediately after initial  surgery, or later in the recovery process. Patient initials: ______________  Other potential complications which I may have include: wound infections or seromas  (fluid collection under skin); hernias (breakdown of tissue holding in abdominal contents);  gastritis or stomach ulcer (inflammation of the stomach); formation of gallstones; formation  of kidney stones or urinary tract infection; or pneumonia. Device related complications such as  foreign body reaction, band slippage, or erosion may occur with the adjustable gastric band  (Lap-Band¢ç/Realize). Patient initials: ______________  -- 21 --  PATIENT GUIDE TO Drake Henson  I may struggle with food intolerances after bariatric surgery. These may include sugars, fats, and  lactose (milk sugar).  My taste for certain foods may change as well. Dumping Syndrome (reaction  caused by sugar rapidly entering the intestine -- symptoms include: nausea, sweating, weakness,  dizziness, flushing, possible vomiting and/or diarrhea) occurs often after bariatric surgery. Vomiting and changes in bowel habits may occur, and may be the result of eating too fast, taking  too large a bite, inappropriate food choice or not chewing properly. Chronic vomiting may be a  result of stenosis (tightening) in the stomach pouch and intestine, and may require that I have  treatment with endoscopy or surgery. Malabsorption may lead to diarrhea, foul smelling gas and  protein malnutrition. Though rarely, I may require feedings through tubes into the digestive tract  or veins for nourishment. I am aware that in some patients hair loss or thinning may occur in the  rapid weight loss phase. This is usually temporary, but can be permanent in some cases. I have  been taught about the importance of proper hydration after bariatric surgery, and understand that  dehydration is the most common reason for re-admission to the hospital after surgery. Patient initials: ______________  I understand that over time, and especially with forced overeating, the stomach pouch may stretch  (dilate) or the staple lines may break. This can result in weight regain, ulcer formation or both. Patient initials: ______________  As a female undergoing bariatric surgery, I acknowledge that I must prevent pregnancies for at  least 12 to 18 months following surgery. Pregnancy during the rapid weight loss phase can be  dangerous and harmful to both the mother and fetus. Patient initials: ______________  Vitamin and mineral deficiencies can occur after bariatric surgery. I am instructed to take vitamin  and mineral supplements daily for life (including multivitamin, iron, B vitamins, calcium and vitamin  D).  Failure to comply with these recommendations could result in my experiencing weakness,  nerve or brain damage, confusion, fatigue, rashes, anemia, hair loss, bone loss, and mood changes. I agree to have lab work at regular intervals to assess for vitamin deficiencies. I understand that it  is imperative that I receive continued follow up care after my bariatric surgery by my surgeon, my  program, or other clinician experienced in bariatric care. Patient initials: ______________  -- 21 --  PATIENT GUIDE TO Drake Henson  If I have an adjustable gastric band (Lap-Band¢ç/Realize), needle adjustments (addition/removal  of saline solution) are required for weight loss and to maintain weight loss. Patient initials: ______________  After I lose weight, the skin of my arms, legs, abdomen, neck, and face may become wrinkled,  droop or sag. Rashes and infections may occur in between my skin folds. Cosmetic surgery may  be indicated in some cases. This is not considered medically necessary in most cases and is rarely  covered by insurance. Patient initials: ______________  I understand that psychological changes may occur with weight loss as a result of bariatric surgery,  which can affect relationships with my loved ones. I agree to re-engage with a mental health  provider as needed. Patient initials: ______________  Some patients elect to have revisional bariatric surgery (correcting anatomic defects from a  previous bariatric operation). I am aware that in these cases, all of the previously addressed risks  apply, however they are three to five times more common. Patient initials: ______________  Follow-up appointments are vital. I agree to the following schedule of appointments after surgery:  two to three weeks, three months, six months, 12 months, and then annually for life. Additional  appointments may be necessary. Gastric Band patient follow-up is determined by my need for  adjustments but is at least once per year after the first year.   Patient initials: ______________  -- 25 --  PATIENT GUIDE  Patient Acknowledgement of Risks, Benefits,  and Alternatives to Bariatric Surgical Procedures  Patient Name:_________________________________________________________________  :_ _______________________________________________________________________  I am requesting bariatric surgery be performed on me. I believe I may benefit from bariatric  surgery. This form is designed to ensure that I understand the risks, benefits, and alternatives to  having bariatric surgery. Bariatric surgery, or surgery for morbid obesity, is major surgery. The  options available include restrictive procedures, or those that limit digestive capacity. Examples  include vertical sleeve gastrectomy, or the adjustable gastric band (Lap-Band¢ç/Realize). Malabsorptive procedures, such as distal gastric bypass produce weight loss by decreasing nutrient  absorption. Biliopancreatic diversion with duodenal switch (BPD/DS) and Trever-en-Y gastric  bypass combine these two processes to varying degrees. While most procedures can be performed  laparoscopically (through multiple small incisions), there is a possibility that an open technique  may be required (through a large incision). There are alternatives to surgery including medications,  diet and exercise, and behavior modification. I understand that obesity is a chronic disease with no  known cure. I am choosing bariatric surgery as treatment for this disease. Patient initials: ______________  I am informed of the potential benefits from bariatric surgery which may include improvements  in associated comorbidities (ie; diabetes, obstructive sleep apnea, GERD, high blood pressure),  potential weight loss of 50-80 percent excess weight, and general improvement in quality of life. The benefits are not guaranteed, and are dependent upon me making the necessary lifestyle  changes for success.  I am aware that some patients may not lose as much weight, or still may  require treatment for medical problems after bariatric surgery. Some patients may gain back some  or all of the weight lost after bariatric surgery. Bariatric surgery is a treatment tool, not a cure for  obesity. Compliance with the dietary and lifestyle recommendations is necessary for maintenance  of lost weight in the long term. For example, I have been taught that it is recommended that  all patients maintain a healthy diet consisting of low-carbohydrate, low-sugar foods rich in lean  protein, and non-starchy vegetables. Regular exercise including aerobic activity and weight  training is encouraged, as well as regular attendance at support group meetings. Patient initials: ______________  -- 23 --  PATIENT GUIDE TO Drake Henson  Eliminating habits that could be detrimental to my health such as drinking alcohol or smoking  is required for all patients. I am aware that the risks of smoking and alcohol use after bariatric  surgery include anesthesia complications, stomach ulcers, liver diseases and malnutrition. In addition, research has shown an increase in sensitivity to alcohol particularly after gastric bypass  procedures resulting in rapid increases in blood alcohol levels and possible addiction. Patient initials: ______________  Because bariatric surgery is considered major surgery, there are many potential complications that  could arise. Some of the problems are related to the bariatric procedure itself, while others are  related to anesthesia and operating on the abdomen.   I understand the serious potential complications include: deep venous thrombosis/pulmonary  embolism (blood clots in the legs and or lungs); gastrointestinal leak (leakage of digestive contents  into the abdomen); sepsis (serious infection); injury to adjacent organs such as esophagus, spleen,  pancreas, liver, diaphragm (requiring intervention or surgical removal); excessive bleeding; bowel  obstruction (blockage of the intestines); or organ failure. I am informed that these complications  can be life threatening. The overall mortality rate (risk of death) from bariatric surgery is close to  0.1 percent (1/1,000), but can be as high as 0.5 percent (1/200) for some patients. Patient initials: ______________  I understand that complications requiring re-operation may occur, either immediately after initial  surgery, or later in the recovery process. Patient initials: ______________  Other potential complications which I may have include: wound infections or seromas  (fluid collection under skin); hernias (breakdown of tissue holding in abdominal contents);  gastritis or stomach ulcer (inflammation of the stomach); formation of gallstones; formation  of kidney stones or urinary tract infection; or pneumonia. Device related complications such as  foreign body reaction, band slippage, or erosion may occur with the adjustable gastric band  (Lap-Band¢ç/Realize). Patient initials: ______________  -- 21 --  PATIENT GUIDE TO Drake Henson  I may struggle with food intolerances after bariatric surgery. These may include sugars, fats, and  lactose (milk sugar). My taste for certain foods may change as well. Dumping Syndrome (reaction  caused by sugar rapidly entering the intestine -- symptoms include: nausea, sweating, weakness,  dizziness, flushing, possible vomiting and/or diarrhea) occurs often after bariatric surgery. Vomiting and changes in bowel habits may occur, and may be the result of eating too fast, taking  too large a bite, inappropriate food choice or not chewing properly. Chronic vomiting may be a  result of stenosis (tightening) in the stomach pouch and intestine, and may require that I have  treatment with endoscopy or surgery. Malabsorption may lead to diarrhea, foul smelling gas and  protein malnutrition.  Though rarely, I may require feedings through tubes into the digestive tract  or veins for nourishment. I am aware that in some patients hair loss or thinning may occur in the  rapid weight loss phase. This is usually temporary, but can be permanent in some cases. I have  been taught about the importance of proper hydration after bariatric surgery, and understand that  dehydration is the most common reason for re-admission to the hospital after surgery. Patient initials: ______________  I understand that over time, and especially with forced overeating, the stomach pouch may stretch  (dilate) or the staple lines may break. This can result in weight regain, ulcer formation or both. Patient initials: ______________  As a female undergoing bariatric surgery, I acknowledge that I must prevent pregnancies for at  least 12 to 18 months following surgery. Pregnancy during the rapid weight loss phase can be  dangerous and harmful to both the mother and fetus. Patient initials: ______________  Vitamin and mineral deficiencies can occur after bariatric surgery. I am instructed to take vitamin  and mineral supplements daily for life (including multivitamin, iron, B vitamins, calcium and vitamin  D). Failure to comply with these recommendations could result in my experiencing weakness,  nerve or brain damage, confusion, fatigue, rashes, anemia, hair loss, bone loss, and mood changes. I agree to have lab work at regular intervals to assess for vitamin deficiencies. I understand that it  is imperative that I receive continued follow up care after my bariatric surgery by my surgeon, my  program, or other clinician experienced in bariatric care. If I have an adjustable gastric band (Lap-Band¢ç/Realize), needle adjustments (addition/removal  of saline solution) are required for weight loss and to maintain weight loss.   Patient initials: ______________  After I lose weight, the skin of my arms, legs, abdomen, neck, and face may become wrinkled,  droop or sag. Rashes and infections may occur in between my skin folds. Cosmetic surgery may  be indicated in some cases. This is not considered medically necessary in most cases and is rarely  covered by insurance. Patient initials: ______________  I understand that psychological changes may occur with weight loss as a result of bariatric surgery,  which can affect relationships with my loved ones. I agree to re-engage with a mental health  provider as needed. Patient initials: ______________  Some patients elect to have revisional bariatric surgery (correcting anatomic defects from a  previous bariatric operation). I am aware that in these cases, all of the previously addressed risks  apply, however they are three to five times more common. Patient initials: ______________  Follow-up appointments are vital. I agree to the following schedule of appointments after surgery:  two to three weeks, three months, six months, 12 months, and then annually for life. Additional  appointments may be necessary. Gastric Band patient follow-up is determined by my need for  adjustments but is at least once per year after the first year  Diet Quick Review  7-Day Preoperative Liquid Diet  Benefits:   Reducing intake before surgery will shrink  your liver by depleting glycogen (a form of  stored energy).  Reduced liver size gives better access to  stomach during surgery, which translates  to a safer surgery.  Prevents the last supper syndrome.  Experiencing weight loss before the  procedure encourages postoperative  compliance and jump-starts weight loss. Specifics:   Start seven days before surgery:  ____________________.  NO SOLID FOODS!!   Your surgery will be CANCELED if this  diet is not followed!!!   Minimum of 64 ounces of fluid daily,  including protein drinks.  No added sugar or carbonated beverages.    Continue to take all your prescribed  medication and your vitamin supplements  during this preoperative diet phase. Clear liquids:   Water.  Sugar free, non-carbonated beverages  (crystal light, propel).  Sugar free popsicles.  Sugar free Jell-O.   Fat free, reduced sodium broth .  Decaffeinated coffee or decaffeinated tea  with artificial sweeteners. Protein:   60 grams of protein daily  (in liquid supplements).  Pre-made protein drinks.  Protein powder added to water.  3 gram rule -- limit sugar and fat to less  than 3 grams per 8 ounces.  4 to 6 ounces of low fat/low sugar yogurt  OR cottage cheese three to four times  during the week. [facility] Gastric Bypass and Sleeve Dietary Progression Quick Review     Date of Surgery: _      __________Clear liquid diet.  Begin bariatric clear liquid diet on: ______________________________________________   64 ounces of fluid per day.  Low calorie, low sugar, non-carbonated beverages:  -- Water, Crystal Light, Propel Water, Sugar Free Jell-O, Sugar Free Popsicles, bouillon. -- Start protein supplement during this stage (60 to 70 grams per day). -- Start all vitamin supplements during this stage (see pages 57-58). -- Getting your fluid in and staying hydrated is your number one priority!  The clear liquid diet will last for seven days. ____________________________________________________     t.  Begin bariatric soft and moist diet on: _____________________________________________  [facility] This stage of the diet will last for five weeks, unless otherwise instructed by your surgeon.  Begin -- one week after surgery.  End -- six weeks after surgery (or when you follow up with the registered dietitian).  Soft, moist, high protein foods -- three meals per day plus protein supplements. -- Portions should emphasize on soft protein. -- Portions will be a MAXIMUM of 1 ounce of solid food and 2 to 3 ounces of cottage cheese and yogurt.   -- Protein supplements should be between meals and provide 30 to 40 grams per day during  soft protein diet. -- Continue to get 64 ounces of fluid in per day.  Protein foods that are OK (SLOW TRANSITION) on the soft and moist diet:  -- First week on soft protein should focus on yogurt, cottage cheese, eggs, VEGETARIAN refried  beans, black beans, kidney beans and white beans. (NO BAKED BEANS.)  -- Second through fourth weeks should focus on yogurt, cottage cheese, eggs, canned tuna,  canned chicken, tilapia and fish (needs to be soft enough to be cut up with a fork). -- Fifth week on soft protein diet should focus on yogurt, cottage cheese, eggs, canned tuna,  canned chicken, tilapia, fish, salmon, chicken breast or turkey. Remember to continue to get 64 ounces of fluid daily on ALL stages. To be advanced to bariatric maintenance stage of the bariatric diet, follow up with the dietitian  six weeks after surgery, around:   ___________________________________________________  After having a sleeve gastrectomy I will not be able to take NSAIDs  (non-steroidal anti-inflammatory drugs) for _________ weeks. 15. After having a gastric bypass I will not be able to take NSAIDs  (non-steroidal anti-inflammatory drugs) for _____________ weeks     Please discuss all of your current medications with your surgeon at your preoperative appointment. Your surgeon will inform you regarding which medications to stop before surgery and which  medications you are to take the morning of surgery. Medications to Stop  To minimize the risk of blood loss during surgery,  you must avoid or stop taking medicines that  contain anti-inflammatories, blood thinners, arthritis  medications, and herbal supplements seven to 14 days  before your surgery. A nurse from pre-anesthesia  testing will review your list of medication. Your current  medications will be reviewed at your preoperative  appointment with your surgeon. Note: You may take prescribed narcotics, such as  Vicodin, Ultram and Neurontin.   Diabetic Medications  Adjustments in your diabetic medications will be discussed with your surgeon at your  preoperative appointment. Birth Control  In order to decrease your chance of getting a postoperative blood clot you will be required to stop  any oral contraceptive two weeks before your surgery date. During this time it is your responsibility  to use an effective form of birth control. You will be required to take a pregnancy test the morning  of surgery at the hospital and surgery will be canceled if you are pregnant. We strongly encourage  that you resume your birth control two weeks after surgery or after your first menstrual cycle  following surgery. Do NOT start any new medications within a month of surgery without  discussing it with your surgeon and/or bariatric team first.  Non-Steroidal Anti-Inflammatory Drugs (NSAIDs)  Bypass:  One class of medications to avoid after Trever-en-Y gastric  bypass is NSAIDs. These can cause ulcers or stomach irritation  and are linked to a kind of ulcer called a marginal ulcer after  gastric bypass. Marginal ulcers can bleed or perforate. Usually  they are not fatal, but they can cause months or years of pain,  and are a common cause of re-operation and reversal of gastric  bypass. You will NEVER be able to take NSAIDs again. Your only choice for over the counter pain medication will be  Tylenol (acetaminophen). Steroid use can also be harmful to your stomach but may be necessary in some situations. Please consult with your bariatric surgeon and prescribing physician for approval.  Sleeve gastrectomy:  Following a sleeve gastrectomy you will not be allowed to take NSAIDs unless it has been  discussed and approved by your surgeon. Most commonly taken NSAIDs to be AVOIDED!! 1. Ibuprofen  2. Advil  3. Motrin  4. Excedrin  5. Aspirin  6. Celebrex  7. Naproxen  8. Aleve  9. Voltaren  10. Mobic     ___Pregnancy  It is in your best interest to avoid pregnancy for  12 to 18 months after surgery.  Pregnancy during  the rapid weight loss phase can be dangerous  and harmful to both mother and fetus. Do you have an effective method of birth  control? Please consult with your OB/GYN or  PCP for consultation. Alcohol  Alcohol is not recommended after bariatric  surgery. Alcohol contains calories but minimal  nutrition and will work against your weight  loss goal. For example, wine contains twice  the calories per ounce that regular soda does. The absorption of alcohol changes with gastric  bypass and gastric sleeve because an enzyme  in the stomach which usually begins to digest  alcohol is absent or greatly reduced making  alcohol more potent. Alcohol may also be absorbed more quickly  into the body after gastric bypass or gastric  sleeve. The absorbed alcohol will be more  potent, and studies have demonstrated  that obesity surgery patients reach a higher  alcohol level and maintain the higher levels for  a longer period than others. In some patients  alcohol use can increase and lead to alcohol  dependence or addiction. For all of these  reasons, it is recommended to avoid alcohol  after bariatric surgery. ___________________________________________  Smoking  It is required that ALL patients stop smoking  (including e-cigarettes and marijuana) and  chewing tobacco three months before  surgery. Prior to surgery your surgeon will  order a test to verify that you have quit. Your  surgery will be canceled if you are smoking. Smoking or chewing tobacco leads to  decreased blood supply to your bodys tissues  and delays healing. Smoking harms every  organ in the body and has been linked to:   Blood clots (the leading cause of death after  bariatric surgery).  Marginal ulcers after gastric bypass.  Heart disease.  Stroke.  Chronic obstructive pulmonary  (lung) disease.  Increased risk for hip fracture.  Cataracts.    Cancer of the mouth, throat, esophagus,  larynx (voice box), stomach, pancreas,  bladder, cervix, and kidney. Packing For the [address] your suitcase for the hospital a day or two before your surgery. Anti-skid socks will be provided. Items to Include in Your Bag   Clothing such as short gowns, short pajamas,  shorts, tops, loose-fitting shorts, bathrobe,  capris, etc.   Tennis shoes or flat runner sole shoes that tie.  Toiletries.  Waterless hand .  Eyeglasses, contact lenses and denture cases.  A list of medications you are currently taking,  including frequency and dosages.  Magazines, books, needle work, crossword  puzzles, etc.   A method of payment to pay for prescriptions. What Not to Bring to the [address] [place] [address] wallet or purse.  Jewelry or other valuables.  Open-toe slippers or shoes without backs. Countdown to Surgery  14 to 30 Days   Attend a preoperative class with the  dietitian and bariatric coordinator.  Pre-admission testing will contact you.  Schedule your preoperative appointment  with your surgeon. 10 to 14 Days   Stop taking medications as instructed by  your physician. Seven Days   Start liquid diet.  Stop all NSAIDS/aspirin. Three Days Before Surgery   Begin CHG skin prep. Day Before Surgery   Pack for the hospital.   Surgical time will be provided via phone call.  YOU MAY NOT HAVE ANYTHING TO EAT  OR DRINK AFTER MIDNIGHT ON THE DAY  BEFORE YOUR SURGERY. This includes gum,  mints, water, etc. You may brush your teeth  the morning of surgery but do not swallow  the water. Simply rinse your mouth out. Day of Surgery   Take medications and brush your teeth  with a sip (1 teaspoon) of water (only the  medications you have been instructed to  take by your surgeon).  Remember to report two hours before your  surgery time.  This will give the nursing staff  sufficient time to start IVs, prep you for  surgery and answer questions  If instructed by your surgeon to use sliding scale insulin   o Use regular insulin (Novolog Pen) according to the following insulin sliding scale:  BLOOD SUGAR: AMOUNT OF INSULIN:  Under 150 no insulin  150-200 2 units  201-250 4 units  251-300 6 units  301-350 8 units  351-400 10 units  400 or greater 12 units and call physician     Things to do following the preoperative class:  ? Thoroughly read this binder before surgery. Things to do before surgery:  ? Start the preoperative liquid diet on: _____________________________________________  ? Stop all NSAIDS (see page 30) and aspirin seven days before my surgery: _________________ .  Jazzy Burks my doctor(PCP or surgeon) regarding stopping Coumadin, Plavix or  other blood thinners. ? Purchase bariatric clear liquids (Crystal Lite, sugar free Jell-O, broth, sugar free popsicles,  protein supplement) and bariatric soft and moist foods (low fat yogurt, cottage cheese, eggs,  tuna, fish, chicken) so that Im prepared when I get home from the hospital.  ? Purchase all vitamins that will be required following surgery. o Chewable multivitamin -- two per day (ex: Flintstones Complete). o Calcium Citrate -- 1,500 milligrams (500 milligrams, three times a day). o Vitamin B-12 -- 1,000 micrograms daily. o Vitamin D3 -- 5,000 IU daily. Vitamin b 1 1 100 mg daily  ? Create a system to keep track of how many ounces of fluid I am drinking daily  o Postoperative GOAL = minimum of 64 ounces per day. ? Purchase a protein supplement that I like.  o Brand: _ _________________________________________________________________ .  o Ounces: _________________________________________________________________ .  Aditya Wells of protein per serving: _________________________________________________ . -- 28 --  PATIENT GUIDE TO [facility] Monter[facility]  6. YOUR SURGERY  Day of Surgery  Before [place] your teeth -- upon awakening, you may brush your teeth and rinse with water, but do not  swallow the water.    Take medication -- take only the medications as instructed by your provider with a small sip of  water as soon as you get up.  Wear proper clothing that is loose-fitting and easily removed.  Avoid back zippers and pantyhose.  Please remove ALL jewelry (leave ALL jewelry and valuables at home).  Avoid using perfumes, deodorant, shaving creams or any scented lotions.  Bring a case with your name on it to hold your eyeglasses, contact lenses, hearing aids  and dentures. Reporting to the [address] [place] [address] will be asked to arrive approximately two  hours before your scheduled surgery. It is important  that you arrive on time to the hospital to avoid any  problems with starting your surgery on time. In some  cases lateness could result in moving your surgery to  a much later time. Your physicians office will provide  your time of arrival to the hospital.  Where Do You Report the Day of Surgery? [place]: [address], [facility], [address]. Enter through the main entrance. Turn left just past the information desk into the  Registration Office. You will check in for surgery there. You may designate one person to be contacted when your surgery has been completed. -- 36 --  [address] [facility] [address]  Before Surgery  Preoperative     Preoperative Preparation Area  Once you arrive at the hospital you will be escorted to the preoperative preparation area. During the preoperative phase, a nurse will review your medical records and conduct a brief  physical examination to include vital signs (i.e., blood pressure, pulse, temperature, respirations or  breathing). An intravenous tube will be inserted with IV fluids. Your skin will be cleansed with CHG  wipes. If you wear dentures, eyeglasses or contact lenses you will need to remove them at this time.   You will see your surgeon, your anesthesiologist and meet the members of your surgical team.  Medications, such as antibiotics, may be given by anesthetists to decrease your infection risk. Other medications may be given to allay any anxiety you may have. You are allowed to have two family members or friends in the preoperative area before surgery. Going into Surgery  The operating room team will escort you into the operating room where your abdomen will be  prepared for surgery. There will be a time out -- a verification of the correct operative site for  patient safety purposes -- performed. Once in the operating room, monitoring devices, such  as a blood pressure cuff, heart monitor and oxygen monitor, will be attached. You will be given  supplemental oxygen as you are readied for anesthesia.  The average length of time for a laparoscopic sleeve gastrectomy is 60 to 90 minutes.  The average length of time for a Trever-en-Y gastric bypass is two to two and a half hours. In the Recovery Area  After your surgery is completed, you will be wheeled into the recovery room. In the recovery room:   Nurses will frequently check your vital signs (i.e., blood pressure, pulse, breathing).  Nurses will medicate you for your pain as needed through the IV line.  Nurses will encourage you to take deep breaths and to move your ankles and feet. Please inform your family the length of time in the recoveryYou will move to your hospital room from the recovery area when you are ready. Your post-surgical  recovery begins here. room will jabari  What to Expect During [address]  From the recovery room, you will be transferred to your hospital room on the bariatric unit  known as 2 Surgical. The private rooms are spacious with large windows and beautiful views. The staff is specially trained in caring for bariatric patients and are extremely friendly and  knowledgeable. We look forward to caring for you during your hospital stay. IV -- IV fluids will be given to help nourish your body after surgery.  You will also be given IV pain  medication. IV fluids will continue until you are discharged from the hospital.  Heart rate monitor (telemetry) -- A heart rate monitor will be worn only in the recovery room  unless otherwise indicated. Shields catheter -- A Shields catheter will be placed in your bladder while you are in the OR and  removed when you arrive to the recovery room. Nasal cannula -- Oxygen will be worn in the nose the night of surgery. Anticoagulation -- A blood thinner may be administered to you to prevent blood clots. Lovenox,  an injectable medication will be used. Drainage tube -- A drainage tube may be inserted into your abdomen during surgery. This tube  collects bloody drainage after surgery. This may be removed prior to discharge from the hospital or  you may be discharged with the drain and it will be removed by your surgeon at your postoperative  visit. If you are discharged with a drain you will be taught how to empty it and care for it. After Surgery   If you do not see your providers clean their hands, please ask them to do so.  Family and friends who visit you should not touch the surgical wound or dressings.  Family and friends should clean their hands with soap and water or an alcohol-based hand  rub before and after visiting you. If you do not see them clean their hands, ask them to clean  their hands.  Make sure you understand how to care for your incision before you leave the hospital.   Always clean your hands before and after caring for your incision.  Make sure you know who to contact if you have questions or problems after you get home.  If you have any symptoms of an infection, such as redness and pain at the surgery site, drainage,  or fever, call your doctor immediately.  Ask your nursing staff to help you out of bed to walk within five hours of arriving at your  hospital room. Getting out of bed to walk helps to decrease complications, such as blood clots  and pneumonia.    of Stay  Yl be required to stay in the hospital for at least ONE night, possibly TWO. Lngth of Stay  Vivi Bun be required to stay in the hospital for at least ONE night, possibly TWO.  edications and Pain Control Options  There are many types of pain control methods that are available to control discomfort. Effective  pain control will allow you to be up and walking shortly after surgery. Your doctor will choose  the method that is right for you. Regardless of the method of pain medication being used, it is  important for you to communicate with your nurse if the pain medication is not enough. Call your  nurse for pain medication when the pain is moderate instead of waiting for when pain is severe. What type of pain should I expect?  Abdominal pain   Rib pain   Shoulder pain   Brick feeling in the center of your chest  Nausea:  Nausea following bariatric surgery is very  common. Causes include:   Anesthesia   Drinking too fast   Pain medication   Surgery itself           Length of Stay  You willExpectations on your Day of Surgery   You will be allowed 1 to 2 ounces of ice chips per hour when you are admitted to the floor. They are solely for your comfort. They are not required.  You will be expected to walk the night of your surgery. Please ask your nurse or nursing assistant  for help the first time you walk. Please do not walk if you still feel groggy or unsteady on your feet.  Your pain will be controlled with oral and IV pain medication on the day of surgery.  In order to prevent pneumonia after surgery you please use your incentive spirometer (ICS)  at least 10 times per hour (see below). be [email or web address]  Bariatric Clear Liquid Diet  You will be started on the bariatric clear liquid diet the morning after your surgery. Your GOAL  will be to drink 4 ounces per hour (SLOW and STEADY) of the following liquids: water, crystal lite,  Jell-O, broth and Unjury (protein supplement).  Feel free to bring your favorite protein supplement  from home.  Two important requirements when drinking is you must slowly SIP the fluid and you must be  SITTING up. Walking  while in the hospital you are expected to walk EVERY hour in the hallway. During your hospital  stay you will also be expected to sit in the recliner throughout the day rather than lie in bed. Pain Medication  The morning after surgery you will continue with oral pain medication ONLY for your pain control. Incentive Spirometer  Continue using your incentive spirometer(ICS) 10 times per hour.

## 2021-08-06 RX ORDER — CHOLECALCIFEROL (VITAMIN D3) 50 MCG
CAPSULE ORAL DAILY
COMMUNITY

## 2021-08-06 NOTE — PERIOP NOTES
PRE-SURGICAL INSTRUCTIONS        Patient's Name:  Nga Bill      FMRTH'T Date:  8/6/2021              Surgery Date:  8/16/2021                1. Do NOT eat or drink anything, including candy, gum, or ice chips after midnight on 8/16, unless you have specific instructions from your surgeon or anesthesia provider to do so.  2. You may brush your teeth before coming to the hospital.  3. No smoking 24 hours prior to the day of surgery. 4. No alcohol 24 hours prior to the day of surgery. 5. No recreational drugs for one week prior to the day of surgery. 6. Leave all valuables, including money/purse, at home. 7. Remove all jewelry, nail polish, acrylic nails, and makeup (including mascara); no lotions powders, deodorant, or perfume/cologne/after shave on the skin. 8. Glasses/contact lenses and dentures may be worn to the hospital.  They will be removed prior to surgery. 9. Call your doctor if symptoms of a cold or illness develop within 24-48 hours prior to your surgery. 10.  AN ADULT MUST DRIVE YOU HOME AFTER OUTPATIENT SURGERY. 11.  If you are having an outpatient procedure, please make arrangements for a responsible adult to be with you for 24 hours after your surgery. Special Instructions:      Bring list of CURRENT medications. Bring any pertinent legal medical records. Take these medications the morning of surgery with a sip of water:  As directed by MD  Follow physician instructions about stopping anticoagulants. On the day of surgery, [facility] Let the  at the desk know you are there for surgery. A staff member will come escort you to the surgical area on the second floor.     If you have any questions or concerns, please do not hesitate to call:     (Prior to the day of surgery) PAT department:  [phone]   (Day of surgery) Pre-Op department:  [phone]    These surgical instructions were reviewed with Nga Khan during the PAT phone call.

## 2021-08-13 ENCOUNTER — ANESTHESIA EVENT (OUTPATIENT)
Dept: SURGERY | Age: 31
DRG: 621 | End: 2021-08-13
Payer: OTHER GOVERNMENT

## 2021-08-16 ENCOUNTER — ANESTHESIA (OUTPATIENT)
Dept: SURGERY | Age: 31
DRG: 621 | End: 2021-08-16
Payer: OTHER GOVERNMENT

## 2021-08-16 ENCOUNTER — HOSPITAL ENCOUNTER (INPATIENT)
Age: 31
LOS: 1 days | Discharge: HOME | DRG: 621 | End: 2021-08-17
Attending: SURGERY | Admitting: SURGERY
Payer: OTHER GOVERNMENT

## 2021-08-16 DIAGNOSIS — E66.01 MORBID OBESITY WITH BMI OF 50.0-59.9, ADULT (HCC): ICD-10-CM

## 2021-08-16 DIAGNOSIS — G89.18 POST-OP PAIN: Primary | ICD-10-CM

## 2021-08-16 LAB
GLUCOSE BLD STRIP.AUTO-MCNC: 178 MG/DL (ref 70–110)
GLUCOSE BLD STRIP.AUTO-MCNC: 182 MG/DL (ref 70–110)
GLUCOSE BLD STRIP.AUTO-MCNC: 183 MG/DL (ref 70–110)
HCG UR QL: NEGATIVE

## 2021-08-16 PROCEDURE — 74011250636 HC RX REV CODE- 250/636: Performed by: SURGERY

## 2021-08-16 PROCEDURE — 77030009851 HC PCH RTVR ENDOSC AMR -B: Performed by: SURGERY

## 2021-08-16 PROCEDURE — 74011250636 HC RX REV CODE- 250/636: Performed by: NURSE ANESTHETIST, CERTIFIED REGISTERED

## 2021-08-16 PROCEDURE — 0D164ZA BYPASS STOMACH TO JEJUNUM, PERCUTANEOUS ENDOSCOPIC APPROACH: ICD-10-PCS | Performed by: SURGERY

## 2021-08-16 PROCEDURE — 77030008598 HC TRCR ENDOSC BLDLS J&J -B: Performed by: SURGERY

## 2021-08-16 PROCEDURE — 77030027138 HC INCENT SPIROMETER -A

## 2021-08-16 PROCEDURE — 77030008437 HC REINF STRP REINF SEMGD WLGO -C: Performed by: SURGERY

## 2021-08-16 PROCEDURE — 74011000250 HC RX REV CODE- 250: Performed by: SURGERY

## 2021-08-16 PROCEDURE — 2709999900 HC NON-CHARGEABLE SUPPLY: Performed by: SURGERY

## 2021-08-16 PROCEDURE — 88307 TISSUE EXAM BY PATHOLOGIST: CPT

## 2021-08-16 PROCEDURE — 74011000258 HC RX REV CODE- 258: Performed by: SURGERY

## 2021-08-16 PROCEDURE — 76060000035 HC ANESTHESIA 2 TO 2.5 HR: Performed by: SURGERY

## 2021-08-16 PROCEDURE — 77030008756 HC TU IRR SUC STRY -B: Performed by: SURGERY

## 2021-08-16 PROCEDURE — 74011250637 HC RX REV CODE- 250/637: Performed by: STUDENT IN AN ORGANIZED HEALTH CARE EDUCATION/TRAINING PROGRAM

## 2021-08-16 PROCEDURE — 77030019605: Performed by: SURGERY

## 2021-08-16 PROCEDURE — 88313 SPECIAL STAINS GROUP 2: CPT

## 2021-08-16 PROCEDURE — 77030033639 HC SHR ENDO COAG HARM 36 J&J -E: Performed by: SURGERY

## 2021-08-16 PROCEDURE — 77030010031 HC SCIS ENDOSC MPLR J&J -C: Performed by: SURGERY

## 2021-08-16 PROCEDURE — 74011250637 HC RX REV CODE- 250/637: Performed by: SURGERY

## 2021-08-16 PROCEDURE — 77030026438 HC STYL ET INTUB CARD -A: Performed by: STUDENT IN AN ORGANIZED HEALTH CARE EDUCATION/TRAINING PROGRAM

## 2021-08-16 PROCEDURE — 77030008603 HC TRCR ENDOSC EPATH J&J -C: Performed by: SURGERY

## 2021-08-16 PROCEDURE — 74011636637 HC RX REV CODE- 636/637: Performed by: NURSE ANESTHETIST, CERTIFIED REGISTERED

## 2021-08-16 PROCEDURE — 81025 URINE PREGNANCY TEST: CPT

## 2021-08-16 PROCEDURE — 77030002933 HC SUT MCRYL J&J -A: Performed by: SURGERY

## 2021-08-16 PROCEDURE — 65270000029 HC RM PRIVATE

## 2021-08-16 PROCEDURE — 2709999900 HC NON-CHARGEABLE SUPPLY

## 2021-08-16 PROCEDURE — 77030008683 HC TU ET CUF COVD -A: Performed by: STUDENT IN AN ORGANIZED HEALTH CARE EDUCATION/TRAINING PROGRAM

## 2021-08-16 PROCEDURE — 43645 LAP GASTR BYPASS INCL SMLL I: CPT | Performed by: SURGERY

## 2021-08-16 PROCEDURE — 77030011808 HC STPLR ENDOSCOPIC J&J -D: Performed by: SURGERY

## 2021-08-16 PROCEDURE — 77030002996 HC SUT SLK J&J -A: Performed by: SURGERY

## 2021-08-16 PROCEDURE — 74011000250 HC RX REV CODE- 250: Performed by: NURSE ANESTHETIST, CERTIFIED REGISTERED

## 2021-08-16 PROCEDURE — 77030036732 HC RELD STPLR VASC J&J -F: Performed by: SURGERY

## 2021-08-16 PROCEDURE — 74011636637 HC RX REV CODE- 636/637: Performed by: SURGERY

## 2021-08-16 PROCEDURE — 76210000016 HC OR PH I REC 1 TO 1.5 HR: Performed by: SURGERY

## 2021-08-16 PROCEDURE — 77030027876 HC STPLR ENDOSC FLX PWR J&J -G1: Performed by: SURGERY

## 2021-08-16 PROCEDURE — 47379 UNLISTED LAPS PX LIVER: CPT | Performed by: SURGERY

## 2021-08-16 PROCEDURE — 74011000272 HC RX REV CODE- 272: Performed by: SURGERY

## 2021-08-16 PROCEDURE — 77030013079 HC BLNKT BAIR HGGR 3M -A: Performed by: STUDENT IN AN ORGANIZED HEALTH CARE EDUCATION/TRAINING PROGRAM

## 2021-08-16 PROCEDURE — 77030040922 HC BLNKT HYPOTHRM STRY -A: Performed by: SURGERY

## 2021-08-16 PROCEDURE — 77030031139 HC SUT VCRL2 J&J -A: Performed by: SURGERY

## 2021-08-16 PROCEDURE — C9290 INJ, BUPIVACAINE LIPOSOME: HCPCS | Performed by: SURGERY

## 2021-08-16 PROCEDURE — 77030040361 HC SLV COMPR DVT MDII -B: Performed by: SURGERY

## 2021-08-16 PROCEDURE — 77030009932 HC PRB FT CTRL J&J -B: Performed by: SURGERY

## 2021-08-16 PROCEDURE — 82962 GLUCOSE BLOOD TEST: CPT

## 2021-08-16 PROCEDURE — 77030009968 HC RELD STPLR ENDOSC J&J -D: Performed by: SURGERY

## 2021-08-16 PROCEDURE — 76010000131 HC OR TIME 2 TO 2.5 HR: Performed by: SURGERY

## 2021-08-16 PROCEDURE — 0FB24ZX EXCISION OF LEFT LOBE LIVER, PERCUTANEOUS ENDOSCOPIC APPROACH, DIAGNOSTIC: ICD-10-PCS | Performed by: SURGERY

## 2021-08-16 RX ORDER — DEXTROSE 50 % IN WATER (D50W) INTRAVENOUS SYRINGE
25-50 AS NEEDED
Status: DISCONTINUED | OUTPATIENT
Start: 2021-08-16 | End: 2021-08-16 | Stop reason: HOSPADM

## 2021-08-16 RX ORDER — MIDAZOLAM HYDROCHLORIDE 1 MG/ML
2 INJECTION, SOLUTION INTRAMUSCULAR; INTRAVENOUS ONCE
Status: DISCONTINUED | OUTPATIENT
Start: 2021-08-16 | End: 2021-08-16

## 2021-08-16 RX ORDER — ACETAMINOPHEN 325 MG/1
650 TABLET ORAL EVERY 6 HOURS
Status: DISCONTINUED | OUTPATIENT
Start: 2021-08-16 | End: 2021-08-17 | Stop reason: HOSPADM

## 2021-08-16 RX ORDER — SODIUM CHLORIDE, SODIUM LACTATE, POTASSIUM CHLORIDE, CALCIUM CHLORIDE 600; 310; 30; 20 MG/100ML; MG/100ML; MG/100ML; MG/100ML
150 INJECTION, SOLUTION INTRAVENOUS CONTINUOUS
Status: DISCONTINUED | OUTPATIENT
Start: 2021-08-16 | End: 2021-08-16 | Stop reason: HOSPADM

## 2021-08-16 RX ORDER — SODIUM CHLORIDE, SODIUM LACTATE, POTASSIUM CHLORIDE, CALCIUM CHLORIDE 600; 310; 30; 20 MG/100ML; MG/100ML; MG/100ML; MG/100ML
75 INJECTION, SOLUTION INTRAVENOUS CONTINUOUS
Status: DISCONTINUED | OUTPATIENT
Start: 2021-08-16 | End: 2021-08-16 | Stop reason: HOSPADM

## 2021-08-16 RX ORDER — WATER 1 ML/ML
IRRIGANT IRRIGATION AS NEEDED
Status: DISCONTINUED | OUTPATIENT
Start: 2021-08-16 | End: 2021-08-16 | Stop reason: HOSPADM

## 2021-08-16 RX ORDER — INSULIN LISPRO 100 [IU]/ML
INJECTION, SOLUTION INTRAVENOUS; SUBCUTANEOUS ONCE
Status: COMPLETED | OUTPATIENT
Start: 2021-08-16 | End: 2021-08-16

## 2021-08-16 RX ORDER — ENOXAPARIN SODIUM 100 MG/ML
40 INJECTION SUBCUTANEOUS EVERY 24 HOURS
Status: DISCONTINUED | OUTPATIENT
Start: 2021-08-17 | End: 2021-08-17 | Stop reason: HOSPADM

## 2021-08-16 RX ORDER — DEXTROSE 50 % IN WATER (D50W) INTRAVENOUS SYRINGE
25-50 AS NEEDED
Status: DISCONTINUED | OUTPATIENT
Start: 2021-08-16 | End: 2021-08-17 | Stop reason: HOSPADM

## 2021-08-16 RX ORDER — CHLORHEXIDINE GLUCONATE 4 G/100ML
SOLUTION TOPICAL AS NEEDED
Status: DISCONTINUED | OUTPATIENT
Start: 2021-08-16 | End: 2021-08-16 | Stop reason: HOSPADM

## 2021-08-16 RX ORDER — HYOSCYAMINE SULFATE 0.12 MG/1
0.12 TABLET SUBLINGUAL
Status: DISCONTINUED | OUTPATIENT
Start: 2021-08-16 | End: 2021-08-17 | Stop reason: HOSPADM

## 2021-08-16 RX ORDER — METOPROLOL TARTRATE 5 MG/5ML
INJECTION INTRAVENOUS AS NEEDED
Status: DISCONTINUED | OUTPATIENT
Start: 2021-08-16 | End: 2021-08-16 | Stop reason: HOSPADM

## 2021-08-16 RX ORDER — SODIUM CHLORIDE 0.9 % (FLUSH) 0.9 %
5-40 SYRINGE (ML) INJECTION EVERY 8 HOURS
Status: DISCONTINUED | OUTPATIENT
Start: 2021-08-16 | End: 2021-08-16 | Stop reason: HOSPADM

## 2021-08-16 RX ORDER — FENTANYL CITRATE 50 UG/ML
INJECTION, SOLUTION INTRAMUSCULAR; INTRAVENOUS AS NEEDED
Status: DISCONTINUED | OUTPATIENT
Start: 2021-08-16 | End: 2021-08-16 | Stop reason: HOSPADM

## 2021-08-16 RX ORDER — SODIUM CHLORIDE, SODIUM LACTATE, POTASSIUM CHLORIDE, CALCIUM CHLORIDE 600; 310; 30; 20 MG/100ML; MG/100ML; MG/100ML; MG/100ML
25 INJECTION, SOLUTION INTRAVENOUS CONTINUOUS
Status: DISCONTINUED | OUTPATIENT
Start: 2021-08-16 | End: 2021-08-16 | Stop reason: HOSPADM

## 2021-08-16 RX ORDER — MIDAZOLAM HYDROCHLORIDE 1 MG/ML
INJECTION, SOLUTION INTRAMUSCULAR; INTRAVENOUS AS NEEDED
Status: DISCONTINUED | OUTPATIENT
Start: 2021-08-16 | End: 2021-08-16 | Stop reason: HOSPADM

## 2021-08-16 RX ORDER — MAGNESIUM SULFATE 100 %
4 CRYSTALS MISCELLANEOUS AS NEEDED
Status: DISCONTINUED | OUTPATIENT
Start: 2021-08-16 | End: 2021-08-16 | Stop reason: HOSPADM

## 2021-08-16 RX ORDER — ROCURONIUM BROMIDE 10 MG/ML
INJECTION, SOLUTION INTRAVENOUS AS NEEDED
Status: DISCONTINUED | OUTPATIENT
Start: 2021-08-16 | End: 2021-08-16 | Stop reason: HOSPADM

## 2021-08-16 RX ORDER — ONDANSETRON 2 MG/ML
INJECTION INTRAMUSCULAR; INTRAVENOUS AS NEEDED
Status: DISCONTINUED | OUTPATIENT
Start: 2021-08-16 | End: 2021-08-16 | Stop reason: HOSPADM

## 2021-08-16 RX ORDER — ONDANSETRON 2 MG/ML
4 INJECTION INTRAMUSCULAR; INTRAVENOUS ONCE
Status: COMPLETED | OUTPATIENT
Start: 2021-08-16 | End: 2021-08-16

## 2021-08-16 RX ORDER — OXYCODONE HYDROCHLORIDE 5 MG/1
5 TABLET ORAL
Status: DISCONTINUED | OUTPATIENT
Start: 2021-08-16 | End: 2021-08-17 | Stop reason: HOSPADM

## 2021-08-16 RX ORDER — HYDROMORPHONE HYDROCHLORIDE 2 MG/ML
0.5 INJECTION, SOLUTION INTRAMUSCULAR; INTRAVENOUS; SUBCUTANEOUS AS NEEDED
Status: DISCONTINUED | OUTPATIENT
Start: 2021-08-16 | End: 2021-08-16 | Stop reason: HOSPADM

## 2021-08-16 RX ORDER — SUCCINYLCHOLINE CHLORIDE 20 MG/ML
INJECTION INTRAMUSCULAR; INTRAVENOUS AS NEEDED
Status: DISCONTINUED | OUTPATIENT
Start: 2021-08-16 | End: 2021-08-16 | Stop reason: HOSPADM

## 2021-08-16 RX ORDER — KETOROLAC TROMETHAMINE 15 MG/ML
15 INJECTION, SOLUTION INTRAMUSCULAR; INTRAVENOUS
Status: DISCONTINUED | OUTPATIENT
Start: 2021-08-16 | End: 2021-08-17 | Stop reason: HOSPADM

## 2021-08-16 RX ORDER — LIDOCAINE HYDROCHLORIDE 10 MG/ML
0.1 INJECTION, SOLUTION EPIDURAL; INFILTRATION; INTRACAUDAL; PERINEURAL AS NEEDED
Status: DISCONTINUED | OUTPATIENT
Start: 2021-08-16 | End: 2021-08-16 | Stop reason: HOSPADM

## 2021-08-16 RX ORDER — SODIUM CHLORIDE, SODIUM LACTATE, POTASSIUM CHLORIDE, CALCIUM CHLORIDE 600; 310; 30; 20 MG/100ML; MG/100ML; MG/100ML; MG/100ML
150 INJECTION, SOLUTION INTRAVENOUS CONTINUOUS
Status: DISCONTINUED | OUTPATIENT
Start: 2021-08-16 | End: 2021-08-17 | Stop reason: HOSPADM

## 2021-08-16 RX ORDER — FENTANYL CITRATE 50 UG/ML
100 INJECTION, SOLUTION INTRAMUSCULAR; INTRAVENOUS ONCE
Status: DISCONTINUED | OUTPATIENT
Start: 2021-08-16 | End: 2021-08-16

## 2021-08-16 RX ORDER — FAMOTIDINE 20 MG/1
20 TABLET, FILM COATED ORAL ONCE
Status: DISCONTINUED | OUTPATIENT
Start: 2021-08-16 | End: 2021-08-16 | Stop reason: SDUPTHER

## 2021-08-16 RX ORDER — NALOXONE HYDROCHLORIDE 0.4 MG/ML
0.4 INJECTION, SOLUTION INTRAMUSCULAR; INTRAVENOUS; SUBCUTANEOUS AS NEEDED
Status: DISCONTINUED | OUTPATIENT
Start: 2021-08-16 | End: 2021-08-17 | Stop reason: HOSPADM

## 2021-08-16 RX ORDER — DEXAMETHASONE SODIUM PHOSPHATE 4 MG/ML
INJECTION, SOLUTION INTRA-ARTICULAR; INTRALESIONAL; INTRAMUSCULAR; INTRAVENOUS; SOFT TISSUE AS NEEDED
Status: DISCONTINUED | OUTPATIENT
Start: 2021-08-16 | End: 2021-08-16 | Stop reason: HOSPADM

## 2021-08-16 RX ORDER — DIPHENHYDRAMINE HYDROCHLORIDE 50 MG/ML
25 INJECTION, SOLUTION INTRAMUSCULAR; INTRAVENOUS
Status: DISCONTINUED | OUTPATIENT
Start: 2021-08-16 | End: 2021-08-17 | Stop reason: HOSPADM

## 2021-08-16 RX ORDER — ONDANSETRON 2 MG/ML
4 INJECTION INTRAMUSCULAR; INTRAVENOUS
Status: DISCONTINUED | OUTPATIENT
Start: 2021-08-16 | End: 2021-08-17 | Stop reason: HOSPADM

## 2021-08-16 RX ORDER — INSULIN LISPRO 100 [IU]/ML
INJECTION, SOLUTION INTRAVENOUS; SUBCUTANEOUS EVERY 6 HOURS
Status: DISCONTINUED | OUTPATIENT
Start: 2021-08-16 | End: 2021-08-17 | Stop reason: HOSPADM

## 2021-08-16 RX ORDER — SODIUM CHLORIDE 0.9 % (FLUSH) 0.9 %
5-40 SYRINGE (ML) INJECTION AS NEEDED
Status: DISCONTINUED | OUTPATIENT
Start: 2021-08-16 | End: 2021-08-16 | Stop reason: HOSPADM

## 2021-08-16 RX ORDER — HYDROMORPHONE HYDROCHLORIDE 1 MG/ML
1 INJECTION, SOLUTION INTRAMUSCULAR; INTRAVENOUS; SUBCUTANEOUS
Status: DISCONTINUED | OUTPATIENT
Start: 2021-08-16 | End: 2021-08-17 | Stop reason: HOSPADM

## 2021-08-16 RX ORDER — INSULIN LISPRO 100 [IU]/ML
INJECTION, SOLUTION INTRAVENOUS; SUBCUTANEOUS ONCE
Status: DISCONTINUED | OUTPATIENT
Start: 2021-08-16 | End: 2021-08-16 | Stop reason: HOSPADM

## 2021-08-16 RX ORDER — PROPOFOL 10 MG/ML
INJECTION, EMULSION INTRAVENOUS AS NEEDED
Status: DISCONTINUED | OUTPATIENT
Start: 2021-08-16 | End: 2021-08-16 | Stop reason: HOSPADM

## 2021-08-16 RX ORDER — SCOLOPAMINE TRANSDERMAL SYSTEM 1 MG/1
1 PATCH, EXTENDED RELEASE TRANSDERMAL
Status: DISCONTINUED | OUTPATIENT
Start: 2021-08-16 | End: 2021-08-16 | Stop reason: HOSPADM

## 2021-08-16 RX ORDER — ACETAMINOPHEN 650 MG/1
650 SUPPOSITORY RECTAL ONCE
Status: DISCONTINUED | OUTPATIENT
Start: 2021-08-16 | End: 2021-08-16 | Stop reason: HOSPADM

## 2021-08-16 RX ORDER — LIDOCAINE HYDROCHLORIDE 20 MG/ML
INJECTION, SOLUTION EPIDURAL; INFILTRATION; INTRACAUDAL; PERINEURAL AS NEEDED
Status: DISCONTINUED | OUTPATIENT
Start: 2021-08-16 | End: 2021-08-16 | Stop reason: HOSPADM

## 2021-08-16 RX ORDER — ENOXAPARIN SODIUM 100 MG/ML
40 INJECTION SUBCUTANEOUS ONCE
Status: COMPLETED | OUTPATIENT
Start: 2021-08-16 | End: 2021-08-16

## 2021-08-16 RX ADMIN — ONDANSETRON 4 MG: 2 INJECTION INTRAMUSCULAR; INTRAVENOUS at 08:07

## 2021-08-16 RX ADMIN — FENTANYL CITRATE 100 MCG: 50 INJECTION, SOLUTION INTRAMUSCULAR; INTRAVENOUS at 09:32

## 2021-08-16 RX ADMIN — FENTANYL CITRATE 100 MCG: 50 INJECTION, SOLUTION INTRAMUSCULAR; INTRAVENOUS at 09:38

## 2021-08-16 RX ADMIN — PROPOFOL 200 MG: 10 INJECTION, EMULSION INTRAVENOUS at 07:43

## 2021-08-16 RX ADMIN — MIDAZOLAM HYDROCHLORIDE 2 MG: 2 INJECTION, SOLUTION INTRAMUSCULAR; INTRAVENOUS at 07:34

## 2021-08-16 RX ADMIN — FENTANYL CITRATE 50 MCG: 50 INJECTION, SOLUTION INTRAMUSCULAR; INTRAVENOUS at 07:50

## 2021-08-16 RX ADMIN — SUCCINYLCHOLINE CHLORIDE 140 MG: 20 INJECTION, SOLUTION INTRAMUSCULAR; INTRAVENOUS at 07:43

## 2021-08-16 RX ADMIN — INSULIN LISPRO 2 UNITS: 100 INJECTION, SOLUTION INTRAVENOUS; SUBCUTANEOUS at 18:05

## 2021-08-16 RX ADMIN — CLINDAMYCIN 900 MG: 150 INJECTION, SOLUTION INTRAMUSCULAR; INTRAVENOUS at 07:56

## 2021-08-16 RX ADMIN — FENTANYL CITRATE 100 MCG: 50 INJECTION, SOLUTION INTRAMUSCULAR; INTRAVENOUS at 08:26

## 2021-08-16 RX ADMIN — KETOROLAC TROMETHAMINE 15 MG: 15 INJECTION, SOLUTION INTRAMUSCULAR; INTRAVENOUS at 18:04

## 2021-08-16 RX ADMIN — FAMOTIDINE 20 MG: 10 INJECTION INTRAVENOUS at 06:44

## 2021-08-16 RX ADMIN — ROCURONIUM BROMIDE 15 MG: 50 INJECTION INTRAVENOUS at 08:24

## 2021-08-16 RX ADMIN — SODIUM CHLORIDE, SODIUM LACTATE, POTASSIUM CHLORIDE, AND CALCIUM CHLORIDE: 600; 310; 30; 20 INJECTION, SOLUTION INTRAVENOUS at 07:17

## 2021-08-16 RX ADMIN — DEXAMETHASONE SODIUM PHOSPHATE 8 MG: 4 INJECTION, SOLUTION INTRAMUSCULAR; INTRAVENOUS at 08:08

## 2021-08-16 RX ADMIN — SODIUM CHLORIDE, SODIUM LACTATE, POTASSIUM CHLORIDE, AND CALCIUM CHLORIDE: 600; 310; 30; 20 INJECTION, SOLUTION INTRAVENOUS at 08:27

## 2021-08-16 RX ADMIN — FENTANYL CITRATE 100 MCG: 50 INJECTION, SOLUTION INTRAMUSCULAR; INTRAVENOUS at 07:34

## 2021-08-16 RX ADMIN — ONDANSETRON 4 MG: 2 INJECTION INTRAMUSCULAR; INTRAVENOUS at 09:59

## 2021-08-16 RX ADMIN — HYDROMORPHONE HYDROCHLORIDE 0.5 MG: 2 INJECTION, SOLUTION INTRAMUSCULAR; INTRAVENOUS; SUBCUTANEOUS at 10:14

## 2021-08-16 RX ADMIN — METOPROLOL TARTRATE 2 MG: 5 INJECTION, SOLUTION INTRAVENOUS at 08:10

## 2021-08-16 RX ADMIN — ROCURONIUM BROMIDE 45 MG: 50 INJECTION INTRAVENOUS at 07:48

## 2021-08-16 RX ADMIN — INSULIN LISPRO 3 UNITS: 100 INJECTION, SOLUTION INTRAVENOUS; SUBCUTANEOUS at 10:18

## 2021-08-16 RX ADMIN — SODIUM CHLORIDE, SODIUM LACTATE, POTASSIUM CHLORIDE, AND CALCIUM CHLORIDE 150 ML/HR: 600; 310; 30; 20 INJECTION, SOLUTION INTRAVENOUS at 18:12

## 2021-08-16 RX ADMIN — ENOXAPARIN SODIUM 40 MG: 40 INJECTION SUBCUTANEOUS at 06:44

## 2021-08-16 RX ADMIN — SODIUM CHLORIDE, SODIUM LACTATE, POTASSIUM CHLORIDE, AND CALCIUM CHLORIDE 150 ML/HR: 600; 310; 30; 20 INJECTION, SOLUTION INTRAVENOUS at 13:36

## 2021-08-16 RX ADMIN — LIDOCAINE HYDROCHLORIDE 100 MG: 20 INJECTION, SOLUTION EPIDURAL; INFILTRATION; INTRACAUDAL; PERINEURAL at 07:43

## 2021-08-16 RX ADMIN — ROCURONIUM BROMIDE 5 MG: 50 INJECTION INTRAVENOUS at 07:43

## 2021-08-16 RX ADMIN — ACETAMINOPHEN 650 MG: 325 TABLET ORAL at 18:04

## 2021-08-16 RX ADMIN — OXYCODONE HYDROCHLORIDE 5 MG: 5 TABLET ORAL at 13:38

## 2021-08-16 RX ADMIN — HYDROMORPHONE HYDROCHLORIDE 0.5 MG: 2 INJECTION, SOLUTION INTRAMUSCULAR; INTRAVENOUS; SUBCUTANEOUS at 10:00

## 2021-08-16 NOTE — PROGRESS NOTES
conducted a pre-surgery visit with Jen Sanchez, who is a 27 y.o.,female. The  provided the following Interventions:  Initiated a relationship of care and support. Offered prayer and assurance of continued prayers on patient's behalf. Patient does not have an advance directive. Plan:  Chaplains will continue to follow and will provide pastoral care on an as needed/requested basis.  recommends bedside caregivers page  on duty if patient shows signs of acute spiritual or emotional distress.     Reiseñor 3   Board Certified [address] [place] [address]   [phone]

## 2021-08-16 NOTE — ROUTINE PROCESS
TRANSFER - IN REPORT:    Verbal report received from [id number] (name) on Milana Nieto  being received from PACU(unit) for routine post - op      Report consisted of patients Situation, Background, Assessment and   Recommendations(SBAR). Information from the following report(s) SBAR, Kardex, OR Summary, Intake/Output, MAR and Recent Results was reviewed with the receiving nurse. Opportunity for questions and clarification was provided. Assessment completed upon patients arrival to unit and care assumed.  at bedside.

## 2021-08-16 NOTE — PROGRESS NOTES
08/16/21 0647   Vitals   Temp 98.9 °F (37.2 °C)   Temp Source Oral   Pulse (Heart Rate) (!) 125   Heart Rate Source Monitor   Resp Rate 20   O2 Sat (%) 97 %   Level of Consciousness Alert (0)   BP (!) 157/81   MAP (Calculated) 106   MEWS Score 3   mews elevated due to HR Dr Gem Gregory notified.  No new orders received

## 2021-08-16 NOTE — PROGRESS NOTES
Problem: Falls - Risk of  Goal: *Absence of Falls  Description: Document [facility] Sites Fall Risk and appropriate interventions in the flowsheet.   8/16/2021 1821 by Caren Garcias RN  Outcome: Progressing Towards Goal  Note: Fall Risk Interventions:                             8/16/2021 1206 by Caren Garcias RN  Outcome: Progressing Towards Goal  Note: Fall Risk Interventions:                             8/16/2021 1205 by Caren Garcias RN  Outcome: Progressing Towards Goal  Note: Fall Risk Interventions:                                Problem: Patient Education: Go to Patient Education Activity  Goal: Patient/Family Education  8/16/2021 1821 by Caren Garcias RN  Outcome: Progressing Towards Goal  8/16/2021 1206 by Caren Garcias RN  Outcome: Progressing Towards Goal  8/16/2021 1205 by Caren Garcias RN  Outcome: Progressing Towards Goal     Problem: Pain  Goal: *Control of Pain  8/16/2021 1821 by Caren Garcias RN  Outcome: Progressing Towards Goal  8/16/2021 1206 by Caren Garcias RN  Outcome: Progressing Towards Goal  8/16/2021 1205 by Caren Garcias RN  Outcome: Progressing Towards Goal     Problem: Patient Education: Go to Patient Education Activity  Goal: Patient/Family Education  8/16/2021 1821 by Caren Garcias RN  Outcome: Progressing Towards Goal  8/16/2021 1206 by Caren Garcias RN  Outcome: Progressing Towards Goal  8/16/2021 1205 by Caren Garcias RN  Outcome: Progressing Towards Goal     Problem: Patient Education: Go to Patient Education Activity  Goal: Patient/Family Education  8/16/2021 1821 by Caren Garcias RN  Outcome: Progressing Towards Goal  8/16/2021 1206 by Caren Garcias RN  Outcome: Progressing Towards Goal  8/16/2021 1205 by Caren Garcias RN  Outcome: Progressing Towards Goal     Problem: Surgical Pathway Day of Surgery  Goal: Off Pathway (Use only if patient is Off Pathway)  8/16/2021 1821 by Caren Garcias RN  Outcome: Progressing Towards Goal  8/16/2021 1206 by Walter Nicole RN  Outcome: Progressing Towards Goal  8/16/2021 1205 by Walter Nicole RN  Outcome: Progressing Towards Goal  Goal: Activity/Safety  8/16/2021 1821 by Walter Nicole RN  Outcome: Progressing Towards Goal  8/16/2021 1206 by Walter Nicole RN  Outcome: Progressing Towards Goal  8/16/2021 1205 by Walter Nicole RN  Outcome: Progressing Towards Goal  Goal: Consults, if ordered  8/16/2021 1821 by Walter Nicole RN  Outcome: Progressing Towards Goal  8/16/2021 1206 by Walter Nicole RN  Outcome: Progressing Towards Goal  8/16/2021 1205 by Walter Nicole RN  Outcome: Progressing Towards Goal  Goal: Nutrition/Diet  8/16/2021 1821 by Walter Nicole RN  Outcome: Progressing Towards Goal  8/16/2021 1206 by Walter Nicole RN  Outcome: Progressing Towards Goal  8/16/2021 1205 by Walter Nicole RN  Outcome: Progressing Towards Goal  Goal: Medications  8/16/2021 1821 by Walter Nicole RN  Outcome: Progressing Towards Goal  8/16/2021 1206 by Walter Nicole RN  Outcome: Progressing Towards Goal  8/16/2021 1205 by Walter Nicole RN  Outcome: Progressing Towards Goal  Goal: Respiratory  8/16/2021 1821 by Walter Nicole RN  Outcome: Progressing Towards Goal  8/16/2021 1206 by Walter Nicole RN  Outcome: Progressing Towards Goal  8/16/2021 1205 by Walter Nicole RN  Outcome: Progressing Towards Goal  Goal: Treatments/Interventions/Procedures  8/16/2021 1821 by Walter Nicole RN  Outcome: Progressing Towards Goal  8/16/2021 1206 by Walter Nicole RN  Outcome: Progressing Towards Goal  8/16/2021 1205 by Walter Nicole RN  Outcome: Progressing Towards Goal  Goal: Psychosocial  8/16/2021 1821 by Walter Nicole RN  Outcome: Progressing Towards Goal  8/16/2021 1206 by Walter Nicole RN  Outcome: Progressing Towards Goal  8/16/2021 1205 by Walter Nicole RN  Outcome: Progressing Towards Goal  Goal: *No signs and symptoms of infection or wound complications  3/51/3708 1821 by Bing Agent, RN  Outcome: Progressing Towards Goal  8/16/2021 1206 by Bing Agent, RN  Outcome: Progressing Towards Goal  8/16/2021 1205 by Bing Agent, RN  Outcome: Progressing Towards Goal  Goal: *Optimal pain control at patient's stated goal  8/16/2021 1821 by Bing Agent, RN  Outcome: Progressing Towards Goal  8/16/2021 1206 by Bing Agent, RN  Outcome: Progressing Towards Goal  8/16/2021 1205 by Bing Agent, RN  Outcome: Progressing Towards Goal  Goal: *Adequate urinary output (equal to or greater than 30 milliliters/hour)  Description: Ambulatory Surgery patients voiding without difficulty.   8/16/2021 1821 by Bing Agent, RN  Outcome: Progressing Towards Goal  8/16/2021 1206 by Bing Agent, RN  Outcome: Progressing Towards Goal  8/16/2021 1205 by Bing Agent, RN  Outcome: Progressing Towards Goal  Goal: *Hemodynamically stable  8/16/2021 1821 by Bing Agent, RN  Outcome: Progressing Towards Goal  8/16/2021 1206 by Bing Agent, RN  Outcome: Progressing Towards Goal  8/16/2021 1205 by Bing Agent, RN  Outcome: Progressing Towards Goal  Goal: *Tolerating diet  8/16/2021 1821 by Bing Agent, RN  Outcome: Progressing Towards Goal  8/16/2021 1206 by Bing Agent, RN  Outcome: Progressing Towards Goal  8/16/2021 1205 by Bing Agent, RN  Outcome: Progressing Towards Goal  Goal: *Demonstrates progressive activity  8/16/2021 1821 by Bing Agent, RN  Outcome: Progressing Towards Goal  8/16/2021 1206 by Bing Agent, RN  Outcome: Progressing Towards Goal  8/16/2021 1205 by Bing Agent, RN  Outcome: Progressing Towards Goal     Problem: Surgical Pathway Post-Op Day 1  Goal: Off Pathway (Use only if patient is Off Pathway)  8/16/2021 1821 by Bing Agent, RN  Outcome: Progressing Towards Goal  8/16/2021 1206 by Amy Salas R, RN  Outcome: Progressing Towards Goal  8/16/2021 1205 by Luis F Landon, RN  Outcome: Progressing Towards Goal  Goal: Activity/Safety  8/16/2021 1821 by Luis F Landon, RN  Outcome: Progressing Towards Goal  8/16/2021 1206 by Luis F Landon, RN  Outcome: Progressing Towards Goal  8/16/2021 1205 by Luis F Landon, RN  Outcome: Progressing Towards Goal  Goal: Diagnostic Test/Procedures  8/16/2021 1821 by Luis F Landon, RN  Outcome: Progressing Towards Goal  8/16/2021 1206 by Luis F Landon, RN  Outcome: Progressing Towards Goal  8/16/2021 1205 by Luis F Landon, RN  Outcome: Progressing Towards Goal  Goal: Nutrition/Diet  8/16/2021 1821 by Luis F Landon, RN  Outcome: Progressing Towards Goal  8/16/2021 1206 by Luis F Landon, RN  Outcome: Progressing Towards Goal  8/16/2021 1205 by Luis F Landon, RN  Outcome: Progressing Towards Goal  Goal: Medications  8/16/2021 1821 by Luis F Landon, RN  Outcome: Progressing Towards Goal  8/16/2021 1206 by Luis F Landon, RN  Outcome: Progressing Towards Goal  8/16/2021 1205 by Luis F Landon, RN  Outcome: Progressing Towards Goal  Goal: Respiratory  8/16/2021 1821 by Luis F Landon, RN  Outcome: Progressing Towards Goal  8/16/2021 1206 by Luis F Landon, RN  Outcome: Progressing Towards Goal  8/16/2021 1205 by Luis F Landon, RN  Outcome: Progressing Towards Goal

## 2021-08-16 NOTE — PROGRESS NOTES
Problem: Patient Education: Go to Patient Education Activity  Goal: Patient/Family Education  8/16/2021 1206 by Ria Millan RN  Outcome: Progressing Towards Goal  8/16/2021 1205 by Ria Millan RN  Outcome: Progressing Towards Goal     Problem: Patient Education: Go to Patient Education Activity  Goal: Patient/Family Education  8/16/2021 1206 by Ria Millan RN  Outcome: Progressing Towards Goal  8/16/2021 1205 by Ria Millan RN  Outcome: Progressing Towards Goal     Problem: Pain  Goal: *Control of Pain  8/16/2021 1206 by Ria Millan RN  Outcome: Progressing Towards Goal  8/16/2021 1205 by Ria Millan RN  Outcome: Progressing Towards Goal     Problem: Falls - Risk of  Goal: *Absence of Falls  Description: Document [facility] Fall Risk and appropriate interventions in the flowsheet.   8/16/2021 1206 by Ria Millan RN  Outcome: Progressing Towards Goal  Note: Fall Risk Interventions:                             8/16/2021 1205 by Ria Millan RN  Outcome: Progressing Towards Goal  Note: Fall Risk Interventions:                                Problem: Patient Education: Go to Patient Education Activity  Goal: Patient/Family Education  8/16/2021 1206 by Ria Millan RN  Outcome: Progressing Towards Goal  8/16/2021 1205 by Ria Millan RN  Outcome: Progressing Towards Goal     Problem: Pain  Goal: *Control of Pain  8/16/2021 1206 by Ria Millan RN  Outcome: Progressing Towards Goal  8/16/2021 1205 by Ria Millan RN  Outcome: Progressing Towards Goal     Problem: Patient Education: Go to Patient Education Activity  Goal: Patient/Family Education  8/16/2021 1206 by Ria Millan RN  Outcome: Progressing Towards Goal  8/16/2021 1205 by Ria Millan RN  Outcome: Progressing Towards Goal     Problem: Patient Education: Go to Patient Education Activity  Goal: Patient/Family Education  8/16/2021 1206 by Ria Millan RN  Outcome: Progressing Towards Goal  8/16/2021 1205 by Ursula Vasques RN  Outcome: Progressing Towards Goal     Problem: Surgical Pathway Day of Surgery  Goal: Off Pathway (Use only if patient is Off Pathway)  8/16/2021 1206 by Ursula Vasques RN  Outcome: Progressing Towards Goal  8/16/2021 1205 by Ursula Vasques RN  Outcome: Progressing Towards Goal  Goal: Activity/Safety  8/16/2021 1206 by Ursula Vasques RN  Outcome: Progressing Towards Goal  8/16/2021 1205 by Ursula Vasques RN  Outcome: Progressing Towards Goal  Goal: Consults, if ordered  8/16/2021 1206 by Ursula Vasques RN  Outcome: Progressing Towards Goal  8/16/2021 1205 by Ursula Vasques RN  Outcome: Progressing Towards Goal  Goal: Nutrition/Diet  8/16/2021 1206 by Ursula Vasques RN  Outcome: Progressing Towards Goal  8/16/2021 1205 by Ursula Vasques RN  Outcome: Progressing Towards Goal  Goal: Medications  8/16/2021 1206 by Ursula Vasques RN  Outcome: Progressing Towards Goal  8/16/2021 1205 by Ursula Vasques RN  Outcome: Progressing Towards Goal  Goal: Respiratory  8/16/2021 1206 by Ursula Vsaques RN  Outcome: Progressing Towards Goal  8/16/2021 1205 by Ursula Vasques RN  Outcome: Progressing Towards Goal  Goal: Treatments/Interventions/Procedures  8/16/2021 1206 by Ursula Vasques RN  Outcome: Progressing Towards Goal  8/16/2021 1205 by Ursula Vasques RN  Outcome: Progressing Towards Goal  Goal: Psychosocial  8/16/2021 1206 by Ursula Vasques RN  Outcome: Progressing Towards Goal  8/16/2021 1205 by Ursula Vasques RN  Outcome: Progressing Towards Goal  Goal: *No signs and symptoms of infection or wound complications  6/83/7441 1206 by Ursula Vasques RN  Outcome: Progressing Towards Goal  8/16/2021 1205 by Ursula Vasques RN  Outcome: Progressing Towards Goal  Goal: *Optimal pain control at patient's stated goal  8/16/2021 1206 by Ursula Vasques RN  Outcome: Progressing Towards Goal  8/16/2021 1205 by Elissa Everett RN  Outcome: Progressing Towards Goal  Goal: *Adequate urinary output (equal to or greater than 30 milliliters/hour)  Description: Ambulatory Surgery patients voiding without difficulty.   8/16/2021 1206 by Elissa Everett RN  Outcome: Progressing Towards Goal  8/16/2021 1205 by Elissa Everett RN  Outcome: Progressing Towards Goal  Goal: *Hemodynamically stable  8/16/2021 1206 by Elissa Everett RN  Outcome: Progressing Towards Goal  8/16/2021 1205 by Elissa Everett RN  Outcome: Progressing Towards Goal  Goal: *Tolerating diet  8/16/2021 1206 by Elissa Everett, RN  Outcome: Progressing Towards Goal  8/16/2021 1205 by Elissa Everett RN  Outcome: Progressing Towards Goal  Goal: *Demonstrates progressive activity  8/16/2021 1206 by Elissa Everett RN  Outcome: Progressing Towards Goal  8/16/2021 1205 by Elissa Everett RN  Outcome: Progressing Towards Goal     Problem: Surgical Pathway Post-Op Day 1  Goal: Off Pathway (Use only if patient is Off Pathway)  8/16/2021 1206 by Elissa Everett RN  Outcome: Progressing Towards Goal  8/16/2021 1205 by Elissa Everett RN  Outcome: Progressing Towards Goal  Goal: Activity/Safety  8/16/2021 1206 by Elissa Everett RN  Outcome: Progressing Towards Goal  8/16/2021 1205 by Elissa Everett RN  Outcome: Progressing Towards Goal  Goal: Diagnostic Test/Procedures  8/16/2021 1206 by Elissa Everett RN  Outcome: Progressing Towards Goal  8/16/2021 1205 by Elissa Everett RN  Outcome: Progressing Towards Goal  Goal: Nutrition/Diet  8/16/2021 1206 by Elissa Everett, RN  Outcome: Progressing Towards Goal  8/16/2021 1205 by Elissa Everett RN  Outcome: Progressing Towards Goal  Goal: Medications  8/16/2021 1206 by Elissa Everett, RN  Outcome: Progressing Towards Goal  8/16/2021 1205 by Elissa Everett RN  Outcome: Progressing Towards Goal  Goal: Respiratory  8/16/2021 1206 by Elissa Everett RN  Outcome: Progressing Towards Goal  8/16/2021 1205 by Mitchell Angelo RN  Outcome: Progressing Towards Goal

## 2021-08-16 NOTE — ANESTHESIA POSTPROCEDURE EVALUATION
Procedure(s):  LAPAROSCOPIC ZENIA-EN-Y GASTRIC BYPASS WITH LIVER WEDGE BIOPSY. general    Anesthesia Post Evaluation      Multimodal analgesia: multimodal analgesia used between 6 hours prior to anesthesia start to PACU discharge  Patient location during evaluation: PACU  Patient participation: complete - patient participated  Level of consciousness: awake  Pain management: adequate  Airway patency: patent  Anesthetic complications: no  Cardiovascular status: acceptable  Respiratory status: acceptable  Hydration status: acceptable  Post anesthesia nausea and vomiting:  controlled  Final Post Anesthesia Temperature Assessment:  Normothermia (36.0-37.5 degrees C)      INITIAL Post-op Vital signs:   Vitals Value Taken Time   /88 08/16/21 1003   Temp 37.2 °C (99 °F) 08/16/21 0949   Pulse 104 08/16/21 1004   Resp 23 08/16/21 1004   SpO2 92 % 08/16/21 1004   Vitals shown include unvalidated device data.

## 2021-08-16 NOTE — PERIOP NOTES
Assumed care of pt from OR via stretcher. Attached to monitor. VSS. OR, MAR and anesthesia report appreciated. Will monitor. 1045  TRANSFER - OUT REPORT:    Verbal report given to Raiford Goldmann, RN (name) on Kenneth Guevara  being transferred to [address] [facility] (unit) for routine post - op       Report consisted of patients Situation, Background, Assessment and   Recommendations(SBAR). Information from the following report(s) SBAR, Kardex, OR Summary, Intake/Output, MAR, Recent Results and Cardiac Rhythm sinus tachy was reviewed with the receiving nurse. Lines:   Peripheral IV 08/16/21 Right Hand (Active)   Site Assessment Clean, dry, & intact 08/16/21 0642   Phlebitis Assessment 0 08/16/21 0642   Dressing Status Clean, dry, & intact 08/16/21 0642   Hub Color/Line Status Infusing;Pink 08/16/21 0642   Alcohol Cap Used No 08/16/21 3490        Opportunity for questions and clarification was provided.       Patient transported with:   O2 @ 2 liters  Tech

## 2021-08-16 NOTE — INTERVAL H&P NOTE
Update History & Physical    The Patient's History and Physical of July 22, 2021 was reviewed. There has been no interval medical or surgical history  The post laparoscopic potentially open Trever-en-Y gastric bypass to achieve definitive durable weight loss on a personal level expected resolution of obesity related comorbidities was reviewed with the patient and I examined the patient. There was has been no change in surgical plan. The surgical site was confirmed by the patient and me. Plan:  The risk, benefits, expected outcome, and alternative to the recommended procedure have been discussed with the patient. Patient understands and wants to proceed with the procedure.     Electronically signed by Bobbi Comer DO on 8/16/2021 at 7:29 AM

## 2021-08-16 NOTE — ROUTINE PROCESS
Bedside verbal report given to Alan Long Oncoming Nurse. Report consisted of patients Situation, Background, Assessment and   Recommendations(SBAR). Information from the following report(s): Kardex, MAR and Recent Results was reviewed with the oncoming nurse. Opportunity for questions and clarification was provided.

## 2021-08-16 NOTE — OP NOTES
[address] [place] [address]   OPERATIVE REPORT    Name:  Ignacia Solomon  MR#:   [[id number]]  :  [id number]  ACCOUNT #:  [de-identified]  DATE OF SERVICE:  2021    PREOPERATIVE DIAGNOSES:  1. Super obesity with a body mass index of 58 and obesity-related comorbidities of prediabetes, hypertriglyceridemia, hypercholesterolemia, gastroesophageal reflux disease, stress urinary incontinence, clinically obstructive sleep apnea, polycystic ovarian syndrome, and weight-related arthropathy. 2.  Suspected hepatic steatosis. POSTOPERATIVE DIAGNOSES:  1. Super obesity with a body mass index of 58 with obesity-related comorbidities of prediabetes, hypertriglyceridemia, hypercholesterolemia, gastroesophageal reflux disease, stress urinary incontinence, clinically obstructive sleep apnea, polycystic ovarian syndrome, and weight-related arthropathy. 2.  Hepatic steatosis. PROCEDURES PERFORMED:  1. Laparoscopic Trever-en-Y gastric bypass utilizing a 15 mL separate tubularized gastric pouch based on the lesser curvature of the stomach, a 868-ZS retrocolic/retrogastric Trever limb, and a 40-cm biliopancreatic limb. 2.  Laparoscopic left hepatic lobe wedge biopsy. SURGEON:  Augie Fletcher DO    FIRST ASSISTANT:  Magdalene Sanders SA    ANESTHESIA:  General endotracheal supplemented at the conclusion of the operative procedure with local infiltration of the operative sites with 266 mg of Exparel diluted in 50 mL of normal saline solution. COMPLICATIONS: None    SPECIMENS REMOVED:  Left hepatic lobe wedge biopsy. IMPLANTS: None    ESTIMATED BLOOD LOSS:  Less than 10 mL. OPERATIVE FINDINGS:  Marked hepatomegaly with morphologic appearance of hepatic steatosis and otherwise normal intra-abdominal anatomy.     INDICATIONS FOR SURGICAL PROCEDURE:  This is a 77-year-old white female who has failed medical management of her super obesity and who, after investigating the medical and surgical options available for definitive weight loss, has elected to pursue a definitive surgical intervention with laparoscopic, potentially open Trever-en-Y gastric bypass to achieve definitive durable weight loss on a personal level with expected resolution of obesity-related comorbidities. The risks and benefits of operative versus nonoperative potential alternatives and potential complications up to and including death were extensively discussed with the patient prior to the surgical procedure, who voiced her understanding and wished to proceed. DESCRIPTION OF PROCEDURE:  The patient received clindamycin for antibiotic prophylaxis secondary to her penicillin allergy. She in addition received Lovenox in the preoperative staging area for DVT prophylaxis, and after voiding and then signing her operative permit, which was properly witnessed, she was transported to the operating room where she was placed in the supine position on the operating table and SCDs were placed and inflated prior to the induction of general endotracheal anesthesia. A 34-Somali orogastric tube was then placed atraumatically for gravity drainage for utilization for gastric decompression as well as a sizing template for construction of the gastric pouch. The abdomen was then prepped and draped in usual sterile fashion. A time-out procedure was performed according to protocol and agreed upon by all personnel in the operating suite. A transverse 12-mm cutaneous incision was made 8 cm below the umbilicus in the midline through which a 12-mm Optiview trocar and cannula were placed in the peritoneal cavity under direct vision utilizing a 10-mm 0-degree laparoscope. The laparoscope and trocar were removed. The abdomen was insufflated with carbon dioxide gas, never exceeding a pressure of 15 mmHg, and a 30-degree 10-mm laparoscope was then placed and utilized for the remainder of the procedure.   The remaining ports were then placed under direct vision through transverse cutaneous incisions utilizing Optiview trocars and cannulas. The second, a 5-mm incision in the left anterior axillary line two fingerbreadths below the left costal margin; the third, a 12-mm incision midway between the left upper quadrant and the supraumbilical port; and the fourth, a 12-mm incision in the right paramedian location 8 cm from the midline, midway between the costal margin and the level of the umbilicus. After a brief exploration of the upper abdomen utilizing a laparoscope which was noted for marked hepatomegaly with a morphologic appearance of hepatic steatosis and otherwise normal intra-abdominal anatomy. The omentum and transverse colon were reflected superiorly. The ligament of Treitz was identified. 40 cm distal to the ligament of Treitz, the jejunum was transected with a triple-load stapling device 60 mm in length loaded with 2.5-mm staples. The mesentery was then divided down to the root of the mesentery utilizing the Harmonic scalpel. The Trever limb was measured to be 155 cm in length, and at this point, on its antimesenteric border, enterotomy was created with a Harmonic scalpel. A similar antimesenteric enterotomy was created 2 cm proximal to the staple line of the biliopancreatic limb, and a stapled side-to-side functional end-to-side jejunojejunostomy was constructed utilizing a triple-load stapling device 60 mm in length loaded with 2.5-mm staples, introducing one arm of the stapling device into each of the respective limbs prior to firing them on their antimesenteric borders. The remaining enteric defect was then closed with a running full-thickness 3-0 Vicryl suture. The mesenteric defect was closed with a running 2-0 silk suture. The ligament of Treitz was re-identified. Just anterior to the ligament of Treitz, a fenestration was created through the mesocolon in the lesser sac utilizing the Harmonic scalpel. The Trever limb was placed through this fenestration into the lesser sac. The omentum and transverse colon were reflected back into their normal anatomic positions. The nakul was identified along the lesser curvature of the stomach. Just inferior to the ankul along the greater curvature of the stomach, the omentum was  from the gastric wall utilizing the Harmonic scalpel until the limb was visualized within the lesser sac. A transverse 5-mm cutaneous incision was then made one-third the distance from the xiphisternal junction to the umbilicus in the midline through which a trocar without a cannula was placed in the peritoneal cavity under direct vision. This was  removed and replaced with a 5-mm atraumatic grasping forceps utilized in conjunction with a self-retaining retractor to elevate the left lobe of the liver and provide hiatal exposure. The peritoneum overlying the angle of His was then opened with the Harmonic scalpel. 5 cm distal to the GE junction along the lesser curvature of the stomach, the neurovascular elements of the lesser omentum were  from the gastric wall utilizing the Harmonic scalpel. Completion of this lesser curve fenestration in the lesser sac was accomplished with an esophageal retractor introduced posterior to the stomach through the omental fenestration along the greater curvature of the stomach. The 34-Rwandan orogastric tube was then retracted into the esophagus. A triple-load stapling device 60 mm in length loaded with 3.5-mm staples was introduced into the lesser curve fenestration. It was oriented perpendicular to the lesser curve 5 cm distal to the GE junction prior to firing two-thirds the length of the staple load. The 34-Rwandan orogastric tube was then advanced and utilized as a sizing template for construction of the tubularized gastric pouch based on the lesser curvature of the stomach.   The vertical aspect of the pouch construction was initiated with a triple-load stapling device 60 mm in length loaded with 3.5-mm staples utilizing two-thirds the length of the staple load. Completion of construction of the gastric pouch was accomplished with sequential applications of a triple-load stapling device 60 mm in length, loaded with 3.5-mm staples, ending the transection at the angle of His. It should be noted the first full length firing of the 2.5-mm stapling device utilized an Ethicon staple line reinforcement strip and this created a 15 mL separate tubularized gastric pouch based along the lesser curvature of the stomach. The Trever limb was then elevated posterior to the stomach in a retrogastric position where a tension-free hand-sewn tubularized gastrojejunostomy was constructed with geometric orientation of the gastrojejunostomy to distal aspect of the tubularized gastric pouch to the antimesenteric border of the Trever limb 2 cm distal to the staple line. A posterior row of running seromuscular 3-0 Vicryl suture was placed. A transverse 12-mm gastrotomy was made at the distal aspect of the tubularized gastric pouch with an adjacent antimesenteric 12-mm Trever limb enterotomy. A running inner layer of full-thickness 3-0 Vicryl suture was initiated in the left lateral posterior aspect of the anastomosis, run across the posterior aspect of the anastomosis, run around the right lateral aspect of the anastomosis through the anterior midline. A second full-thickness 3-0 Vicryl suture was initiated adjacent to the origin of the first and run around the left lateral aspect of the anastomosis to the anterior midline. The 34-Togolese orogastric tube was then advanced across the gastrojejunal anastomosis into the Trever limb prior to tying the running inner layer of full 3-0 Vicryl sutures together anteriorly. The gastrojejunal anastomosis was then completed anteriorly utilizing a running seromuscular 3-0 Vicryl suture.   The 34-Togolese orogastric tube was removed and after assuring there was adequate redundancy of the Trever limb above the level of the mesocolon, the omentum and transverse colon were reflected superiorly. The space through which a Cortes hernia might occur was closed with a running 2-0 silk suture. The mesocolic defect was then closed with a series of simple interrupted 2-0 silk sutures. The omentum and transverse colon were reflected back into normal anatomic position. The Trever limb was noted to have adequate redundancy above the level of the mesocolon. There was no tension noted at the gastrojejunal anastomosis. The self-retaining retractor and atraumatic grasping forceps were removed. The free edge of the left lobe of the liver was retracted in such a fashion so that a 1.5-cm wedge-shaped biopsy could be  obtained for definitive histologic characterization. Hemostasis was then established with electrocautery. The laparoscope was then shifted to the left mid abdominal port to allow visualization of the supraumbilical fascial trocar defect, which was closed with a suture passing device and a #2 Vicryl suture. All operative sites were inspected and noted to be hemostatic. The abdomen was then desufflated of carbon dioxide gas. The trocars were removed under direct vision. The supraumbilical trocar fascial suture was tied. The wound was irrigated with normal saline solution. Closure of the skin was accomplished with a series of simple interrupted buried deep dermal 4-0 Monocryl sutures. The wound was then infiltrated with 266 mL of Exparel diluted in 50 mL of normal saline solution. Steri-Strips were applied as were sterile dressings. Sponge and needle counts were correct both during and at the completion of the procedure. The patient tolerated the procedure without operative complications and subsequently was extubated and transported to the recovery room, awake, alert, and stable condition. The patient received 1500 mL of crystalloid during the procedure.   The operative start time was 0754, completion time is 2 [facility],       [id number]  D:  08/16/2021 10:03  T:  08/16/2021 17:17  JOB #:  [id number]

## 2021-08-16 NOTE — PROGRESS NOTES
Patient's blood pressure and heart rate are elevated, which is likely pain-driven as she is complaining of pain 7/10. Oxycodone given, will monitor vitals for effectiveness and notify MD if no change. Patient ambulating in hallway.

## 2021-08-16 NOTE — ANESTHESIA PREPROCEDURE EVALUATION
Relevant Problems   No relevant active problems       Anesthetic History     PONV          Review of Systems / Medical History  Patient summary reviewed, nursing notes reviewed and pertinent labs reviewed    Pulmonary  Within defined limits                 Neuro/Psych   Within defined limits           Cardiovascular    Hypertension                   GI/Hepatic/Renal  Within defined limits              Endo/Other      Hypothyroidism: well controlled  Morbid obesity     Other Findings              Physical Exam    Airway  Mallampati: II  TM Distance: 4 - 6 cm  Neck ROM: normal range of motion   Mouth opening: Normal     Cardiovascular    Rhythm: regular  Rate: normal         Dental  No notable dental hx       Pulmonary  Breath sounds clear to auscultation               Abdominal  GI exam deferred       Other Findings            Anesthetic Plan    ASA: 3  Anesthesia type: general          Induction: Intravenous  Anesthetic plan and risks discussed with: Patient      Pre-op scopolamine patch for ponv

## 2021-08-17 VITALS
HEIGHT: 65 IN | DIASTOLIC BLOOD PRESSURE: 95 MMHG | HEART RATE: 81 BPM | TEMPERATURE: 97.8 F | RESPIRATION RATE: 16 BRPM | SYSTOLIC BLOOD PRESSURE: 153 MMHG | OXYGEN SATURATION: 98 % | WEIGHT: 293 LBS | BODY MASS INDEX: 48.82 KG/M2

## 2021-08-17 LAB
ANION GAP SERPL CALC-SCNC: 9 MMOL/L (ref 3–18)
BASOPHILS # BLD: 0.1 K/UL (ref 0–0.1)
BASOPHILS NFR BLD: 0 % (ref 0–2)
BUN SERPL-MCNC: 9 MG/DL (ref 7–18)
BUN/CREAT SERPL: 13 (ref 12–20)
CALCIUM SERPL-MCNC: 8 MG/DL (ref 8.5–10.1)
CHLORIDE SERPL-SCNC: 106 MMOL/L (ref 100–111)
CO2 SERPL-SCNC: 23 MMOL/L (ref 21–32)
CREAT SERPL-MCNC: 0.67 MG/DL (ref 0.6–1.3)
DIFFERENTIAL METHOD BLD: ABNORMAL
EOSINOPHIL # BLD: 0 K/UL (ref 0–0.4)
EOSINOPHIL NFR BLD: 0 % (ref 0–5)
ERYTHROCYTE [DISTWIDTH] IN BLOOD BY AUTOMATED COUNT: 14.9 % (ref 11.6–14.5)
ERYTHROCYTE [DISTWIDTH] IN BLOOD BY AUTOMATED COUNT: 15.1 % (ref 11.6–14.5)
GLUCOSE BLD STRIP.AUTO-MCNC: 123 MG/DL (ref 70–110)
GLUCOSE BLD STRIP.AUTO-MCNC: 135 MG/DL (ref 70–110)
GLUCOSE BLD STRIP.AUTO-MCNC: 137 MG/DL (ref 70–110)
GLUCOSE SERPL-MCNC: 127 MG/DL (ref 74–99)
HCT VFR BLD AUTO: 37.7 % (ref 35–45)
HCT VFR BLD AUTO: 38.1 % (ref 35–45)
HGB BLD-MCNC: 12.2 G/DL (ref 12–16)
HGB BLD-MCNC: 12.2 G/DL (ref 12–16)
LYMPHOCYTES # BLD: 3.3 K/UL (ref 0.9–3.6)
LYMPHOCYTES NFR BLD: 22 % (ref 21–52)
MAGNESIUM SERPL-MCNC: 2 MG/DL (ref 1.6–2.6)
MCH RBC QN AUTO: 27.3 PG (ref 24–34)
MCH RBC QN AUTO: 27.3 PG (ref 24–34)
MCHC RBC AUTO-ENTMCNC: 32 G/DL (ref 31–37)
MCHC RBC AUTO-ENTMCNC: 32.4 G/DL (ref 31–37)
MCV RBC AUTO: 84.3 FL (ref 74–97)
MCV RBC AUTO: 85.2 FL (ref 74–97)
MONOCYTES # BLD: 1 K/UL (ref 0.05–1.2)
MONOCYTES NFR BLD: 7 % (ref 3–10)
NEUTS SEG # BLD: 10.9 K/UL (ref 1.8–8)
NEUTS SEG NFR BLD: 71 % (ref 40–73)
PLATELET # BLD AUTO: 389 K/UL (ref 135–420)
PLATELET # BLD AUTO: 409 K/UL (ref 135–420)
PMV BLD AUTO: 10.2 FL (ref 9.2–11.8)
PMV BLD AUTO: 10.4 FL (ref 9.2–11.8)
POTASSIUM SERPL-SCNC: 4.4 MMOL/L (ref 3.5–5.5)
RBC # BLD AUTO: 4.47 M/UL (ref 4.2–5.3)
RBC # BLD AUTO: 4.47 M/UL (ref 4.2–5.3)
SODIUM SERPL-SCNC: 138 MMOL/L (ref 136–145)
WBC # BLD AUTO: 15.4 K/UL (ref 4.6–13.2)
WBC # BLD AUTO: 17.8 K/UL (ref 4.6–13.2)

## 2021-08-17 PROCEDURE — 83735 ASSAY OF MAGNESIUM: CPT

## 2021-08-17 PROCEDURE — 82962 GLUCOSE BLOOD TEST: CPT

## 2021-08-17 PROCEDURE — 74011250636 HC RX REV CODE- 250/636: Performed by: SURGERY

## 2021-08-17 PROCEDURE — 85025 COMPLETE CBC W/AUTO DIFF WBC: CPT

## 2021-08-17 PROCEDURE — 2709999900 HC NON-CHARGEABLE SUPPLY

## 2021-08-17 PROCEDURE — 80048 BASIC METABOLIC PNL TOTAL CA: CPT

## 2021-08-17 PROCEDURE — 74011250637 HC RX REV CODE- 250/637: Performed by: SURGERY

## 2021-08-17 PROCEDURE — C9113 INJ PANTOPRAZOLE SODIUM, VIA: HCPCS | Performed by: SURGERY

## 2021-08-17 PROCEDURE — 36415 COLL VENOUS BLD VENIPUNCTURE: CPT

## 2021-08-17 PROCEDURE — 85027 COMPLETE CBC AUTOMATED: CPT

## 2021-08-17 PROCEDURE — 77010033678 HC OXYGEN DAILY

## 2021-08-17 PROCEDURE — 74011000250 HC RX REV CODE- 250: Performed by: SURGERY

## 2021-08-17 RX ORDER — ONDANSETRON 4 MG/1
4 TABLET, ORALLY DISINTEGRATING ORAL
Qty: 12 TABLET | Refills: 0 | Status: SHIPPED | OUTPATIENT
Start: 2021-08-17 | End: 2021-11-23 | Stop reason: ALTCHOICE

## 2021-08-17 RX ORDER — OXYCODONE HYDROCHLORIDE 5 MG/1
5 TABLET ORAL
Qty: 10 TABLET | Refills: 0 | Status: SHIPPED | OUTPATIENT
Start: 2021-08-17 | End: 2021-08-20

## 2021-08-17 RX ADMIN — OXYCODONE HYDROCHLORIDE 5 MG: 5 TABLET ORAL at 11:29

## 2021-08-17 RX ADMIN — SODIUM CHLORIDE, SODIUM LACTATE, POTASSIUM CHLORIDE, AND CALCIUM CHLORIDE 150 ML/HR: 600; 310; 30; 20 INJECTION, SOLUTION INTRAVENOUS at 06:27

## 2021-08-17 RX ADMIN — ENOXAPARIN SODIUM 40 MG: 40 INJECTION SUBCUTANEOUS at 11:30

## 2021-08-17 RX ADMIN — SODIUM CHLORIDE 40 MG: 9 INJECTION, SOLUTION INTRAMUSCULAR; INTRAVENOUS; SUBCUTANEOUS at 08:54

## 2021-08-17 RX ADMIN — ACETAMINOPHEN 650 MG: 325 TABLET ORAL at 00:13

## 2021-08-17 RX ADMIN — ONDANSETRON 4 MG: 2 INJECTION INTRAMUSCULAR; INTRAVENOUS at 10:09

## 2021-08-17 RX ADMIN — ACETAMINOPHEN 650 MG: 325 TABLET ORAL at 11:36

## 2021-08-17 RX ADMIN — KETOROLAC TROMETHAMINE 15 MG: 15 INJECTION, SOLUTION INTRAMUSCULAR; INTRAVENOUS at 00:19

## 2021-08-17 RX ADMIN — ACETAMINOPHEN 650 MG: 325 TABLET ORAL at 06:21

## 2021-08-17 NOTE — PROGRESS NOTES
Problem: Falls - Risk of  Goal: *Absence of Falls  Description: Document Mchenry Tyler Fall Risk and appropriate interventions in the flowsheet. Outcome: Progressing Towards Goal  Note: Fall Risk Interventions:                                Problem: Patient Education: Go to Patient Education Activity  Goal: Patient/Family Education  Outcome: Progressing Towards Goal     Problem: Pain  Goal: *Control of Pain  Outcome: Progressing Towards Goal     Problem: Patient Education: Go to Patient Education Activity  Goal: Patient/Family Education  Outcome: Progressing Towards Goal     Problem: Patient Education: Go to Patient Education Activity  Goal: Patient/Family Education  Outcome: Progressing Towards Goal     Problem: Surgical Pathway Day of Surgery  Goal: Off Pathway (Use only if patient is Off Pathway)  Outcome: Progressing Towards Goal  Goal: Activity/Safety  Outcome: Progressing Towards Goal  Goal: Consults, if ordered  Outcome: Progressing Towards Goal  Goal: Nutrition/Diet  Outcome: Progressing Towards Goal  Goal: Medications  Outcome: Progressing Towards Goal  Goal: Respiratory  Outcome: Progressing Towards Goal  Goal: Treatments/Interventions/Procedures  Outcome: Progressing Towards Goal  Goal: Psychosocial  Outcome: Progressing Towards Goal  Goal: *No signs and symptoms of infection or wound complications  Outcome: Progressing Towards Goal  Goal: *Optimal pain control at patient's stated goal  Outcome: Progressing Towards Goal  Goal: *Adequate urinary output (equal to or greater than 30 milliliters/hour)  Description: Ambulatory Surgery patients voiding without difficulty.   Outcome: Progressing Towards Goal  Goal: *Hemodynamically stable  Outcome: Progressing Towards Goal  Goal: *Tolerating diet  Outcome: Progressing Towards Goal  Goal: *Demonstrates progressive activity  Outcome: Progressing Towards Goal     Problem: Surgical Pathway Post-Op Day 1  Goal: Off Pathway (Use only if patient is Off Pathway)  Outcome: Progressing Towards Goal  Goal: Activity/Safety  Outcome: Progressing Towards Goal  Goal: Diagnostic Test/Procedures  Outcome: Progressing Towards Goal  Goal: Nutrition/Diet  Outcome: Progressing Towards Goal  Goal: Medications  Outcome: Progressing Towards Goal  Goal: Respiratory  Outcome: Progressing Towards Goal

## 2021-08-17 NOTE — ROUTINE PROCESS
Mobility Intervention:       [] Pt dangled at edge of bed    [] Pt assisted OOB to bedside commode    [] Pt assisted OOB to chair    [] Pt ambulated to bathroom    [x] Patient was ambulated in room/hallway    Assistive Device Utilized:       [] Rolling walker   [] Crutches   [] Straight Cane   [] Knee immobilizer   [x] IV pole    After Mobilization:     [x] Patient left in no apparent distress sitting up in chair  [] Patient left in no apparent distress in bed  [x] Call bell left within reach  [x] SCDs on & machine turned on  [x] Ice applied  [] RN notified  [] Caregiver present  [] Bed alarm activated    Reason patient not mobilized:      [] Patient refused   [] Nausea/vomiting   [] Low blood pressure   [] Drowsy/lethargic    Pain Rating:     [x] 0  [] 1  Assistive Device:        [] 2  [] 3  [] 4  [] 5  [] 6  Assistive Device:        [] 7  [] 8  [] 9  [] 10    Comments:

## 2021-08-17 NOTE — ROUTINE PROCESS
Patient was educated on all discharge instructions below. He/she understood and was provided a copy. He/she knows who to call for any issues post discharge. Hydration  Hydration is your NUMBER ONE priority. Dehydration is the most common reason for readmission to the hospital. Dehydration occurs when  your body does not get enough fluid to keep it functioning at its best. Your body also requires fluid  to burn its stored fat calories for energy. Carry a bottle of water with you all day, even when you are away from home; remind yourself to  drink even if you dont feel thirsty. Drinking 64 ounces of fluid is your daily goal. You can tell if  youre getting enough fluid if youre making clear, light-colored urine five to 10 times per day. Signs of dehydration can be thirst, headache, hard stools or dizziness upon sitting or standing up. You should contact your surgeons office if you are unable to drink enough fluid to stay hydrated. [address] [place] [address] Street after Surgery   No lifting over 15 pounds for four weeks.  No driving while taking the pain medication (about seven to 10 days).  No tub baths, swimming or hot tubs until incisions are healed (about two weeks).  You may shower. Clean incisions daily /gently with soap and check incisions for signs of infection:   Redness around incision.  Swelling at site.  Drainage with an foul odor (pus).  Increase tenderness around incision.  Take your temperature and resting pulse in the morning and evening. Record on tracking form  given to you. Call if your temperature is greater than 101 or your pulse rate is greater than 115.  Please contact your surgeon if you are having excessive abdominal pain (that lasts longer than  four hours and does not improve with prescribed pain medication), vomiting or shortness of breath.  Get up and move  do not sit in one place for more than an hour.    You need to WALK (EXERCISE) for 30 minutes per day.  Walking around your house does not count.  Bike, treadmill and elliptical are OK.  NO weight lifting or sit ups.  If constipated take an adult dose of Miralax (available over the counter). Contact the doctors  office if Miralax doesnt help.  You may swallow pills starting the day after surgery as long as they fit inside this [place]:   Continue to use your incentive spirometer (breathing machine) for the next couple of weeks to  help prevent pneumonia. Temperature/Heart Rate Log  Take your temperature and heart rate (pulse) twice a day for 14 days. Take both in the morning and  evening at about the same time each day (when you wake up and before you go to bed when you  are relaxed). Please contact your doctors office if your:   Temperature is higher than 101 degrees.  Heart rate (pulse) is higher than 115 beats per minute  (normal heart rate is 60 to 100 beats per minute). How to Take Your Heart Rate (Pulse)   Turn your left hand so that your palm is face-up.  With the index and middle fingers of your right  hand, draw a line from the base of your thumb to  just below the crease in your wrist. Your fingers  should nestle just to the left of the large tendon that  pops up when you bend your wrist toward you.  Dont press too hard, that will make the pulse go  away. Use gentle pressure.  Wait. It can take several seconds  and several micro-adjustments in the placement of your two  fingers on your wrist  to find your pulse. Just keep moving your fingers down or up your wrist  in small increments (and pausing for a few seconds) until you find it. How to Take Your Pulse Rate   Find a watch with a second hand and place it on your right wrist or on the table next  to your left hand.  After finding your pulse, count the number of beats for 20 seconds.  Multiply by three to get your heart rate, or beats per minute  (or just count for 60 seconds for a math-free option).    Normal, resting heart rate is about 60 to 100 beats per minute.  46   PATIENT GUIDE TO BARIATRIC SURGERY    Lovenox Self Injection Guide  Prepare  Step 1: Wash and dry your hands thoroughly. Step 2: Sit or lie in a comfortable position and choose an area  on the right or left side of the abdomen at least two inches away  from the belly button. Step 3: Clean the injection site with an alcohol swab and let dry. Inject  Step 4: Remove the needle cap by pulling it straight off the syringe and  discard it in a sharps . Step 5: With your other hand, pinch an inch of the cleansed area to  make a fold in the skin. Insert the full length of the needle straight  down  at a 90? angle  into the fold of skin.  [address]  Step 6: Press the plunger with your thumb until the syringe is empty. Then pull the needle straight out and release the skin fold. Dispose  Step 7: Point the needle down and away from yourself and others,  and then push down on the plunger to activate the safety shield. Step 8: Place the used syringe in the sharps . Do NOT expel the air bubble from the syringe before the injection. Administration should be alternated between the left and right abdominal wall. The whole length  of the needle should be introduced into a skin fold held between the thumb and forefinger; the  skin fold should be held throughout the injection. To minimize bruising, do not rub the injection  site after completion of the injection. Questions about LOVENOX? Call [phone]   52   PATIENT GUIDE TO BARIATRIC SURGERY    9. DIET AND LIFESTYLE  Key Diet Principles Following Bariatric Surgery   Begin each meal with soft moist high protein foods (i.e. chicken, turkey, yogurt, tuna, eggs,  cottage cheese, other fish and seafood).  Consume a minimum of 64 ounces of fluid each day to prevent dehydration. No straws.    No food and fluid together. Stop drinking 30 minutes before a meal. You may begin fluids again  30 minutes after you finish a meal.   Eat very slowly and chew all foods completely.  Keep portions small.  No simple sugars or high fat foods. No carbonated beverages. No caffeine.  Eat three meals per day. No skipping. Avoid snacking between meals.  No alcohol. No smoking.  Two Flintstones Complete Chewable vitamins each day. Take one in the morning and one at night.  1,500 milligrams Calcium Citrate per day in separate dosages.  Vitamin D 3: 5,000 IU taken per day.  Vitamin B-12: Take 1,000 micrograms sublingually daily.  Iron: 60 milligrams per day from [facility].  Protein supplements of your choice. Must be low sugar (0 to 3 grams), low fat (0 to 3 grams) and  provide at least 35 to 40 grams of protein each day. You need 60 to 70 grams of protein (food  and supplements) each day.  Minimum of 30 minutes of physical activity daily. Do not finesse NSAIDS . Do not take Steroids without your surgeons permission.  48   PATIENT GUIDE TO BARIATRIC SURGERY    Your Priorities After Surgery  ? Fluid: 64 ounces of fluid per day. ? Protein: 60 to 70 grams of protein per day. ? Walk every day. Clear Liquid Diet  One week of clear liquids: minimum of 64 ounces of fluid per day. Fluid:   Zero calorie liquids.  No caffeine.  No carbonation.  No sugary drinks.  No alcohol.  No straws. Food   Protein drinks.  Less than 3 grams of sugar and  3 grams of fat per serving.  Protein drink should provide you with  60 to 70 grams of protein. Soft Protein Diet  Five weeks of soft protein (1 ounce for soft protein, 3 ounces of yogurt/cottage cheese). Three meals per day and 1 protein shake. Protein shakes should provide you with 30 grams of  protein on the soft protein diet.   Slow transition:   First week on soft protein diet  focus on yogurt, cottage cheese, eggs, vegetarian refried beans,  black beans, kidney beans and white beans. (NO BAKED BEANS.)   Second through fourth week on soft protein diet  focus on yogurt, cottage cheese, eggs,  canned tuna, canned chicken, tilapia and fish (needs to be soft enough to be cut up with a fork).  Fifth week on soft protein diet  focus on yogurt, cottage cheese, eggs, canned tuna, canned  chicken, tilapia, fish, salmon, chicken breast or turkey. Fluid is your #1 Priority! Continue clear liquids between meals. You will need 64 ounces of fluid per day. Fluids that you can have include:   Water.  Zero calorie liquids. You will need to sip throughout the day and should therefore have a water bottle with you at all  times! No liquids with your meals. Stop 30 minutes before a meal and wait 30 minutes after a meal.  No straws. Zero calorie liquids. No caffeine. No carbonation. No sugary drinks. No alcohol.  51   PATIENT GUIDE TO BARIATRIC SURGERY    Protein  You will need 60 to 70 grams of protein per day.  60 to 70 grams of protein shakes when on the clear liquid diet (two to three shakes per day).  30 to 50 grams of protein shakes when on the soft protein diet (one shake per day). Eat Three Times Per Day  You will need to eat three times per day. My planned times are:  _________________________________________________________  _________________________________________________________  _________________________________________________________  Nausea, Vomiting, Stomach Pain  If you have problems with nausea, vomiting or stomach pain, try:   Eating slowly: 20 to 30 minutes per meal.   Chewing food thoroughly: 20 to 30 chews before food is swallowed.  Small portions: measure portions in medicine cup.  Stopping before feeling full.  AVOIDING SUGAR and FRIED FOOD: sugar will cause dumping syndrome and lead to weight gain. Exercise  I will need to get a minimum of 30 minutes of exercise per day or 150 minutes of exercise per week.    Walking, swimming, biking or elliptical.   Find something you enjoy! Vitamins  After surgery, you will need to take the following vitamins for the rest of your life  FOREVER.  Vitamin D 3: 5,000 IU per day.  Calcium Citrate: 1,500 milligrams, taken separately.  Flintstones Complete: two per day, taken separately.  Sublingual Vitamin B-12: 1,000 micrograms daily.  Iron for menstruating women or patients with a history of low iron: 65 milligrams daily. We recommend going to [email or web address] [email or web address] and purchasing iron from there. The lemon-lime has 60 milligrams. This iron is better absorbed than over-the-counter iron.  52   PATIENT GUIDE TO BARIATRIC SURGERY    Clear Liquid Log  Getting your fluid in is top priority during this week. Fluids (MINIUM of 64 ounces per day):  ? 8 oz. ? 8 oz. ? 8 oz. ? 8 oz. ? 8 oz. ? 8 oz. ? 8 oz. ? 8 oz. ? 8 oz. ? 8 oz. ? 8 oz. ? 8 oz. Flintstones Complete Chewable: ? a.m. ? p.m. Calcium Citrate (1,500 milligrams/day):  Pill form  ? Two crushed pills (morning) ? Two crushed pills (afternoon) ? Two crushed pills (evening)  OR Upcal D (powder)  ? One pack/scoop ? One pack/scoop ? One pack/scoop  OR Celebrate Chewable Vitamins (500 mg each) or Bariat[facility] Advantage Chewables (500 mg)  ? One chewable (morning) ? One chewable (afternoon) ? One chewable (evening)  OR Liquid Calcium Citrate  ? 1 tbsp. Calcium Citrate ? 1 tbsp. Calcium Citrate ? 1 tbsp. Calcium Citrate  Vitamin D3: ? 5,000 IU daily. Vitamin B-12: ? 1,000 micrograms per day. Iron (menstruating women or patients with a history of low iron):  ? 60 milligrams per day from [facility]. Protein drinks (protein drinks should be under 3 grams of sugar and 3 grams of fat). Protein shake (60 grams per day): ? a.m. ? p.m. Exercise: ? 30 to 40 minutes per day.  48   PATIENT GUIDE TO BARIATRIC SURGERY    Bariatric Soft and Moist Diet Shopping List   alcium Citrate (1,500 milligrams/day):  Pill form  ?  Two crushed pills (morning) ? Two crushed pills (afternoon) ? Two crushed pills (evening)  OR Upcal D (powder)  ? One pack/scoop ? One pack/scoop ? One pack/scoop  OR Celebrate Chewable Vitamins (500 mg each) or Bariat[facility] Advantage Chewables (500 mg)  ? One chewable (morning) ? One chewable (afternoon) ? One chewable (evening)  OR Liquid Calcium Citrate  ? 1 tbsp. Calcium Citrate ? 1 tbsp. Calcium Citrate ? 1 tbsp. Calcium Citrate  Vitamin D3: ? 5,000 IU daily  Vitamin B-12: ? 1,000 micrograms per day  Vitamin b 1 100mg daily  Iron (menstruating women or  patients with a history of low iron):  ? 60 milligrams per day  from [facility]  Protein drinks (protein drinks should be under  3 grams of sugar and 3 grams of fat). Vitamin b 1 100mg daily  Protein shake (30 to 40 grams per day):  ? a.m. ? p.m. Exercise: ? 30 to 40 minutes per  Educated on Diet Progression    [facility] Gastric Bypass and Sleeve Dietary Progression    Patient Name:   Date of Surgery: Ice Chips start once admitted on floor. Begin Bariatric Clear Liquid Diet on:     Clear Liquid Diet: 64 oz. of fluid per day  o Low calorie, low sugar, non-carbonated beverages  - Water, Crystal Light, Propel Water, Sugar Free Jell-O, Sugar Free Popsicles, Bouillon  - Start protein supplement during this stage. (60-70 grams per day)  - Getting your fluid in and staying hydrated is your #1 priority! - The clear liquid diet will last for 7 days. Begin Bariatric Soft and Moist on:   - This stage of the diet will last for 5 weeks, unless otherwise instructed by your surgeon. - Begin:  1 week post-op   - End:  6 weeks post-op (or when you follow up with the Registered Dietitian)    - Soft, moist, high protein foods: 3 meals per day plus protein supplements. o   Portions should emphasize on soft protein. o Portions will be a MAXIMUM of:  o  1 ounce of solid food  o  2-3 ounces of cottage cheese and yogurt.   o Protein supplements should be between meals and provide 30-40 grams per day during soft protein diet. o Continue to get 64 ounces of fluid in per day. - Protein foods that are ok on the Soft and Moist Diet include:  o Slow transition:  o 1st week on soft protein should focus on: Yogurt, cottage cheese, eggs  o 2nd -4th  week on soft protein diet should focus on: yogurt, cottage cheese, eggs, canned tuna, canned chicken, tilapia, fish (needs to be soft enough to be cut up with a fork)  o 5th week on soft protein diet should focus on: Yogurt, cottage cheese, eggs, canned tuna, canned chicken, tilapia, fish, salmon, chicken breast, or turkey. Remember to continue to get 64 ounces of fluid daily on ALL Stages. To be advanced to Bariatric Maintenance Stage of the bariatric diet, follow up with the dietitian 6 weeks post-op, around:         For any additional questions, please refer to your blue binder that was provided to you at the start of the bariatric program.

## 2021-08-17 NOTE — ROUTINE PROCESS
Mobility Intervention:       [] Pt dangled at edge of bed    [] Pt assisted OOB to bedside commode    [] Pt assisted OOB to chair    [] Pt ambulated to bathroom    [x] Patient was ambulated in room/hallway    Assistive Device Utilized:       [] Rolling walker   [] Crutches   [] Straight Cane   [] Knee immobilizer   [x] IV pole    After Mobilization:     [] Patient left in no apparent distress sitting up in chair  [x] Patient left in no apparent distress in bed  [x] Call bell left within reach  [x] SCDs on & machine turned on  [x] Ice applied  [] RN notified  [] Caregiver present  [] Bed alarm activated    Reason patient not mobilized:      [] Patient refused   [] Nausea/vomiting   [] Low blood pressure   [] Drowsy/lethargic    Pain Rating:     [x] 0  [] 1  Assistive Device:        [] 2  [] 3  [] 4  [] 5  [] 6  Assistive Device:        [] 7  [] 8  [] 9  [] 10    Comments:

## 2021-08-17 NOTE — PROGRESS NOTES
Problem: Falls - Risk of  Goal: *Absence of Falls  Description: Document Etelvina Good Fall Risk and appropriate interventions in the flowsheet. Outcome: Progressing Towards Goal  Note: Fall Risk Interventions:                                Problem: Patient Education: Go to Patient Education Activity  Goal: Patient/Family Education  Outcome: Progressing Towards Goal     Problem: Pain  Goal: *Control of Pain  Outcome: Progressing Towards Goal     Problem: Patient Education: Go to Patient Education Activity  Goal: Patient/Family Education  Outcome: Progressing Towards Goal     Problem: Patient Education: Go to Patient Education Activity  Goal: Patient/Family Education  Outcome: Progressing Towards Goal     Problem: Surgical Pathway Day of Surgery  Goal: Off Pathway (Use only if patient is Off Pathway)  Outcome: Progressing Towards Goal  Goal: Activity/Safety  Outcome: Progressing Towards Goal  Goal: Consults, if ordered  Outcome: Progressing Towards Goal  Goal: Nutrition/Diet  Outcome: Progressing Towards Goal  Goal: Medications  Outcome: Progressing Towards Goal  Goal: Respiratory  Outcome: Progressing Towards Goal  Goal: Treatments/Interventions/Procedures  Outcome: Progressing Towards Goal  Goal: Psychosocial  Outcome: Progressing Towards Goal  Goal: *No signs and symptoms of infection or wound complications  Outcome: Progressing Towards Goal  Goal: *Optimal pain control at patient's stated goal  Outcome: Progressing Towards Goal  Goal: *Adequate urinary output (equal to or greater than 30 milliliters/hour)  Description: Ambulatory Surgery patients voiding without difficulty.   Outcome: Progressing Towards Goal  Goal: *Hemodynamically stable  Outcome: Progressing Towards Goal  Goal: *Tolerating diet  Outcome: Progressing Towards Goal  Goal: *Demonstrates progressive activity  Outcome: Progressing Towards Goal     Problem: Surgical Pathway Post-Op Day 1  Goal: Off Pathway (Use only if patient is Off Pathway)  Outcome: Progressing Towards Goal  Goal: Activity/Safety  Outcome: Progressing Towards Goal  Goal: Diagnostic Test/Procedures  Outcome: Progressing Towards Goal  Goal: Nutrition/Diet  Outcome: Progressing Towards Goal  Goal: Medications  Outcome: Progressing Towards Goal  Goal: Respiratory  Outcome: Progressing Towards Goal

## 2021-08-17 NOTE — PROGRESS NOTES
Problem: Falls - Risk of  Goal: *Absence of Falls  Description: Document Tracee Baldwin Fall Risk and appropriate interventions in the flowsheet.   8/17/2021 1404 by Edmond Kidd  Outcome: Resolved/Met  8/17/2021 1358 by Edmond Kidd  Outcome: Progressing Towards Goal  Note: Fall Risk Interventions:                                Problem: Patient Education: Go to Patient Education Activity  Goal: Patient/Family Education  8/17/2021 1404 by Edmond Kidd  Outcome: Resolved/Met  8/17/2021 1358 by Edmond Kidd  Outcome: Progressing Towards Goal     Problem: Pain  Goal: *Control of Pain  8/17/2021 1404 by Edmond Kidd  Outcome: Resolved/Met  8/17/2021 1358 by Edmond Kidd  Outcome: Progressing Towards Goal     Problem: Patient Education: Go to Patient Education Activity  Goal: Patient/Family Education  8/17/2021 1404 by Edmond Kidd  Outcome: Resolved/Met  8/17/2021 1358 by Edmond Kidd  Outcome: Progressing Towards Goal     Problem: Patient Education: Go to Patient Education Activity  Goal: Patient/Family Education  8/17/2021 1404 by Edmond Kidd  Outcome: Resolved/Met  8/17/2021 1358 by Edmond Kidd  Outcome: Progressing Towards Goal     Problem: Surgical Pathway Day of Surgery  Goal: Off Pathway (Use only if patient is Off Pathway)  8/17/2021 1404 by Edmond Kidd  Outcome: Resolved/Met  8/17/2021 1358 by Edmond Kidd  Outcome: Progressing Towards Goal  Goal: Activity/Safety  8/17/2021 1404 by Edmond Kidd  Outcome: Resolved/Met  8/17/2021 1358 by Edmond Kidd  Outcome: Progressing Towards Goal  Goal: Consults, if ordered  8/17/2021 1404 by Edmond Kidd  Outcome: Resolved/Met  8/17/2021 1358 by Edmond Kidd  Outcome: Progressing Towards Goal  Goal: Nutrition/Diet  8/17/2021 1404 by Edmond Kidd  Outcome: Resolved/Met  8/17/2021 1358 by Edmond Kidd  Outcome: Progressing Towards Goal  Goal: Medications  8/17/2021 1404 by Edmond Kidd  Outcome: Resolved/Met  8/17/2021 1358 by Daxa Neff  Outcome: Progressing Towards Goal  Goal: Respiratory  8/17/2021 1404 by Daxa Neff  Outcome: Resolved/Met  8/17/2021 1358 by Daxa Neff  Outcome: Progressing Towards Goal  Goal: Treatments/Interventions/Procedures  8/17/2021 1404 by Daxa Neff  Outcome: Resolved/Met  8/17/2021 1358 by Daxa Neff  Outcome: Progressing Towards Goal  Goal: Psychosocial  8/17/2021 1404 by Daxa Neff  Outcome: Resolved/Met  8/17/2021 1358 by Daxa Neff  Outcome: Progressing Towards Goal  Goal: *No signs and symptoms of infection or wound complications  7/10/1244 1404 by Daxa Neff  Outcome: Resolved/Met  8/17/2021 1358 by Daxa Neff  Outcome: Progressing Towards Goal  Goal: *Optimal pain control at patient's stated goal  8/17/2021 1404 by Daxa Neff  Outcome: Resolved/Met  8/17/2021 1358 by Daxa Neff  Outcome: Progressing Towards Goal  Goal: *Adequate urinary output (equal to or greater than 30 milliliters/hour)  Description: Ambulatory Surgery patients voiding without difficulty.   8/17/2021 1404 by Daxa Neff  Outcome: Resolved/Met  8/17/2021 1358 by Daxa Neff  Outcome: Progressing Towards Goal  Goal: *Hemodynamically stable  8/17/2021 1404 by Daxa Neff  Outcome: Resolved/Met  8/17/2021 1358 by Daxa Neff  Outcome: Progressing Towards Goal  Goal: *Tolerating diet  8/17/2021 1404 by Daxa Neff  Outcome: Resolved/Met  8/17/2021 1358 by Daxa Neff  Outcome: Progressing Towards Goal  Goal: *Demonstrates progressive activity  8/17/2021 1404 by Daax Neff  Outcome: Resolved/Met  8/17/2021 1358 by Daxa Neff  Outcome: Progressing Towards Goal     Problem: Surgical Pathway Post-Op Day 1  Goal: Off Pathway (Use only if patient is Off Pathway)  8/17/2021 1404 by Daxa Neff  Outcome: Resolved/Met  8/17/2021 1358 by Daxa Neff  Outcome: Progressing Towards Goal  Goal: Activity/Safety  8/17/2021 1404 by Rosey Skinner  Outcome: Resolved/Met  8/17/2021 1358 by Rosey Skinner  Outcome: Progressing Towards Goal  Goal: Diagnostic Test/Procedures  8/17/2021 1404 by Rosey Skinner  Outcome: Resolved/Met  8/17/2021 1358 by Rosey Skinner  Outcome: Progressing Towards Goal  Goal: Nutrition/Diet  8/17/2021 1404 by Rosey Skinner  Outcome: Resolved/Met  8/17/2021 1358 by Rosey Skinner  Outcome: Progressing Towards Goal  Goal: Medications  8/17/2021 1404 by Rosey Skinner  Outcome: Resolved/Met  8/17/2021 1358 by Rosey Skinner  Outcome: Progressing Towards Goal  Goal: Respiratory  8/17/2021 1404 by Rosey Skinner  Outcome: Resolved/Met  8/17/2021 1358 by Rosey Skinner  Outcome: Progressing Towards Goal

## 2021-08-17 NOTE — DISCHARGE SUMMARY
Bariatric Surgery Discharge Progress Note    Admission Date: 8/16/2021    Discharge Date: 8/17/2021    PREOPERATIVE DIAGNOSES:  1. Super obesity with a body mass index of 58 and obesity-related comorbidities of prediabetes, hypertriglyceridemia, hypercholesterolemia, gastroesophageal reflux disease, stress urinary incontinence, clinically obstructive sleep apnea, polycystic ovarian syndrome, and weight-related arthropathy. 2.  Suspected hepatic steatosis.     POSTOPERATIVE DIAGNOSES:  1. Super obesity with a body mass index of 58 with obesity-related comorbidities of prediabetes, hypertriglyceridemia, hypercholesterolemia, gastroesophageal reflux disease, stress urinary incontinence, clinically obstructive sleep apnea, polycystic ovarian syndrome, and weight-related arthropathy. 2.  Hepatic steatosis.     PROCEDURES PERFORMED:  1. Laparoscopic Trever-en-Y gastric bypass utilizing a 15 mL separate tubularized gastric pouch based on the lesser curvature of the stomach, a [id number] retrocolic/retrogastric Trever limb, and a 40-cm biliopancreatic limb. 2.  Laparoscopic left hepatic lobe wedge biopsy. Postop Complications: none    Hospital Course:  Patient was admitted on 8/16/2021 for scheduled bariatric surgery. Operation was without significant complication. Patient admitted to the floor postoperatively, monitored as per protocol. Diet sequentially advanced beginning POD 1, pain medications transitioned to oral during the hospital course. Currently the patient is afebrile, vital signs stable, tolerating a clear liquid diet with protein supplementation, voiding spontaneously, ambulatory with adequate pain control with oral medications and clear surgical sites without evidence of infection.     Discharge Diet:  Clear Liquid Bariatric Diet for 7 days, then soft moist protein diet for 5 weeks    Discharge Medications:  Current Discharge Medication List      START taking these medications    Details   oxyCODONE IR (ROXICODONE) 5 mg immediate release tablet Take 1 Tablet by mouth every six (6) hours as needed for Pain for up to 3 days. Max Daily Amount: 20 mg.  Qty: 10 Tablet, Refills: 0  Start date: 8/17/2021, End date: 8/20/2021    Associated Diagnoses: Post-op pain      ondansetron (ZOFRAN ODT) 4 mg disintegrating tablet Take 1 Tablet by mouth every eight (8) hours as needed for Nausea or Vomiting. Qty: 12 Tablet, Refills: 0  Start date: 8/17/2021         CONTINUE these medications which have NOT CHANGED    Details   cholecalciferol, vitamin D3, (Vitamin D3) 100 mcg (4,000 unit) cap Take  by mouth daily. enoxaparin (LOVENOX) 40 mg/0.4 mL 0.4 mL by SubCUTAneous route daily. Qty: 7 Syringe, Refills: 0  Start date: 8/16/2021    Comments: surg 8/16      Tirosint 125 mcg cap 2 Tabs daily.          STOP taking these medications       spironolactone (ALDACTONE) 50 mg tablet Comments:   Reason for Stopping:         ferrous gluconate 324 mg (38 mg iron) tablet Comments:   Reason for Stopping:             Bariatric Chewable vitamins, 2 orally daily for life  Calcium Citrate 1500 mg orally daily for life  Vitamin B12 1000 micrograms sublingual daily for life  Vitamin D3 5,000 units orally daily for life   Vitamin B1 100 mg orally daily for life    Discharge disposition: home    Discharge condition: stable      Local wound care with daily showers, keep wounds clean and dry     Activity: as desired, no lifting, pushing, or pulling greater than 15lbs or situps for 30 days     Special Instructions:            - No driving until activity is not influenced by incisional pain and off narcotics            - No bath or hot tub until wounds are healed            - Check pulse and temperature twice daily for 10 days            - Notify [facility] for a Temp >100.5 or Pulse>115     Follow-up with your surgeon in 2 weeks, call office for appointment [phone] ([address] [place] [address]) [phone]([address])

## 2021-08-17 NOTE — DIABETES MGMT
GLYCEMIC CONTROL:    Received consult. Seen patient this AM POD#1 gastric bypass. Patient reported no history of diabetes. Current insulin order:  Correctional lispro. Normal sensitivity dose    Total daily dose insulin on 8/16: 5 units lispro. Noted patient received Dexamethason 8 mg x1 dose on 8/16. Recommendation: None.       Liat Saravia RN [facility]  Pager: [phone]

## 2021-08-17 NOTE — PROGRESS NOTES
Discharge order noted for today. Orders reviewed. No needs identified at this time.  remains available if needed.   Luis Hallman RN - Outcomes Manager  [phone]

## 2021-08-17 NOTE — PROGRESS NOTES
Patient has met her fluid goals of 4 ounces an hour for 3 consecutive hours. Steri strips to abdomen clean dry and intact. Discharge instructions given, PIV removed. Patient stable for discharge home with family.

## 2021-08-17 NOTE — ROUTINE PROCESS
Patient is alert and oriented times three with no sgins or symptoms of distress.  No compliants of nausea  Only mild pain

## 2021-08-17 NOTE — PROGRESS NOTES
Surgery Progress Note    8/17/2021    Admit Date: 8/16/2021    Subjective:     Patient has complaints of pain and is controlled with current plan. Patient has been ambulating in halls. She reports no nausea and no vomiting. Bowel Movements: Flatus    Objective:     Blood pressure 135/71, pulse 98, temperature 98.2 °F (36.8 °C), resp. rate 16, height 5' 5\" (1.651 m), weight 157.4 kg (347 lb), last menstrual period 06/22/2021, SpO2 98 %. No intake/output data recorded. 08/15 1901 - 08/17 0700  In: 4227.5 [P.O.:150;  I.V.:4077.5]  Out: 3600 [Urine:3600]    EXAM: GENERAL: alert, pleasant, no distress   HEART: regular rate and rhythm   LUNGS: clear to auscultation   ABDOMEN:  Soft, obese, appropriate incisional tenderness, +BS, non-distended, surgical incisions clean, dry, no erythema or drainage   EXTREMITIES: warm, well perfused    Data Review    Recent Results (from the past 24 hour(s))   GLUCOSE, POC    Collection Time: 08/16/21 10:10 AM   Result Value Ref Range    Glucose (POC) 178 (H) 70 - 110 mg/dL   GLUCOSE, POC    Collection Time: 08/16/21 11:23 AM   Result Value Ref Range    Glucose (POC) 182 (H) 70 - 110 mg/dL   GLUCOSE, POC    Collection Time: 08/16/21  5:18 PM   Result Value Ref Range    Glucose (POC) 183 (H) 70 - 110 mg/dL   GLUCOSE, POC    Collection Time: 08/17/21 12:35 AM   Result Value Ref Range    Glucose (POC) 137 (H) 70 - 110 mg/dL   CBC W/O DIFF    Collection Time: 08/17/21  4:25 AM   Result Value Ref Range    WBC 17.8 (H) 4.6 - 13.2 K/uL    RBC 4.47 4.20 - 5.30 M/uL    HGB 12.2 12.0 - 16.0 g/dL    HCT 38.1 35.0 - 45.0 %    MCV 85.2 74.0 - 97.0 FL    MCH 27.3 24.0 - 34.0 PG    MCHC 32.0 31.0 - 37.0 g/dL    RDW 14.9 (H) 11.6 - 14.5 %    PLATELET 207 361 - 633 K/uL    MPV 10.4 9.2 - 33.8 FL   METABOLIC PANEL, BASIC    Collection Time: 08/17/21  4:25 AM   Result Value Ref Range    Sodium 138 136 - 145 mmol/L    Potassium 4.4 3.5 - 5.5 mmol/L    Chloride 106 100 - 111 mmol/L    CO2 23 21 - 32 mmol/L    Anion gap 9 3.0 - 18 mmol/L    Glucose 127 (H) 74 - 99 mg/dL    BUN 9 7.0 - 18 MG/DL    Creatinine 0.67 0.6 - 1.3 MG/DL    BUN/Creatinine ratio 13 12 - 20      GFR est AA >60 >60 ml/min/1.73m2    GFR est non-AA >60 >60 ml/min/1.73m2    Calcium 8.0 (L) 8.5 - 10.1 MG/DL   MAGNESIUM    Collection Time: 08/17/21  4:25 AM   Result Value Ref Range    Magnesium 2.0 1.6 - 2.6 mg/dL   GLUCOSE, POC    Collection Time: 08/17/21  5:37 AM   Result Value Ref Range    Glucose (POC) 135 (H) 70 - 110 mg/dL       Assessment:   Naeem Saunders is a 27 y.o. female,  day 1 status post   1. Laparoscopic Trever-en-Y gastric bypass utilizing a 15 mL separate tubularized gastric pouch based on the lesser curvature of the stomach, a 328-SH retrocolic/retrogastric Trever limb, and a 40-cm biliopancreatic limb.   2.  Laparoscopic left hepatic lobe wedge biopsy.   Condition: good    Plan:   -Recheck CBC  -Ambulate every four hours  -Pain managed prior to d/c   -Nausea managed prior to d/c   -Advance to Clear liquid Gastric Bypass Diet, if able to tolerate clear liquid diet (with pro shake) 4oz per hour for 3 hours prior to d/c    If patient continues to progress d/c home later today     Zahida Hayes NP  7:59 AM  8/17/2021

## 2021-08-17 NOTE — ROUTINE PROCESS
Bedside shift change report given to Joseph Awan rn (oncoming nurse) by Lupis Smart (offgoing nurse). Report included the following information SBAR.

## 2021-08-26 ENCOUNTER — HOSPITAL ENCOUNTER (OUTPATIENT)
Dept: LAB | Age: 31
Discharge: HOME | End: 2021-08-26
Payer: OTHER GOVERNMENT

## 2021-08-26 ENCOUNTER — OFFICE VISIT (OUTPATIENT)
Dept: SURGERY | Age: 31
End: 2021-08-26
Payer: OTHER GOVERNMENT

## 2021-08-26 VITALS
BODY MASS INDEX: 48.82 KG/M2 | HEART RATE: 105 BPM | HEIGHT: 65 IN | OXYGEN SATURATION: 94 % | TEMPERATURE: 97.3 F | SYSTOLIC BLOOD PRESSURE: 114 MMHG | DIASTOLIC BLOOD PRESSURE: 71 MMHG | WEIGHT: 293 LBS

## 2021-08-26 DIAGNOSIS — K92.1 MELENA: Primary | ICD-10-CM

## 2021-08-26 DIAGNOSIS — Z98.84 S/P GASTRIC BYPASS: ICD-10-CM

## 2021-08-26 DIAGNOSIS — K91.2 POST-RESECTION MALABSORPTION: ICD-10-CM

## 2021-08-26 DIAGNOSIS — K92.1 MELENA: ICD-10-CM

## 2021-08-26 DIAGNOSIS — E66.01 MORBID OBESITY WITH BMI OF 50.0-59.9, ADULT (HCC): ICD-10-CM

## 2021-08-26 LAB
BASOPHILS # BLD: 0.1 K/UL (ref 0–0.1)
BASOPHILS NFR BLD: 1 % (ref 0–2)
DIFFERENTIAL METHOD BLD: ABNORMAL
EOSINOPHIL # BLD: 0.5 K/UL (ref 0–0.4)
EOSINOPHIL NFR BLD: 4 % (ref 0–5)
ERYTHROCYTE [DISTWIDTH] IN BLOOD BY AUTOMATED COUNT: 14.6 % (ref 11.6–14.5)
HCT VFR BLD AUTO: 39.8 % (ref 35–45)
HGB BLD-MCNC: 12.9 G/DL (ref 12–16)
LYMPHOCYTES # BLD: 3.2 K/UL (ref 0.9–3.6)
LYMPHOCYTES NFR BLD: 29 % (ref 21–52)
MCH RBC QN AUTO: 27.3 PG (ref 24–34)
MCHC RBC AUTO-ENTMCNC: 32.4 G/DL (ref 31–37)
MCV RBC AUTO: 84.3 FL (ref 78–100)
MONOCYTES # BLD: 0.9 K/UL (ref 0.05–1.2)
MONOCYTES NFR BLD: 8 % (ref 3–10)
NEUTS SEG # BLD: 6.4 K/UL (ref 1.8–8)
NEUTS SEG NFR BLD: 58 % (ref 40–73)
PLATELET # BLD AUTO: 469 K/UL (ref 135–420)
PMV BLD AUTO: 10.9 FL (ref 9.2–11.8)
RBC # BLD AUTO: 4.72 M/UL (ref 4.2–5.3)
WBC # BLD AUTO: 11 K/UL (ref 4.6–13.2)

## 2021-08-26 PROCEDURE — 99024 POSTOP FOLLOW-UP VISIT: CPT | Performed by: NURSE PRACTITIONER

## 2021-08-26 PROCEDURE — 85025 COMPLETE CBC W/AUTO DIFF WBC: CPT

## 2021-08-26 PROCEDURE — 36415 COLL VENOUS BLD VENIPUNCTURE: CPT

## 2021-08-26 RX ORDER — BISMUTH SUBSALICYLATE 262 MG
1 TABLET,CHEWABLE ORAL DAILY
COMMUNITY

## 2021-08-26 RX ORDER — IBUPROFEN 200 MG
CAPSULE ORAL DAILY
COMMUNITY

## 2021-08-26 RX ORDER — LANOLIN ALCOHOL/MO/W.PET/CERES
1000 CREAM (GRAM) TOPICAL DAILY
COMMUNITY

## 2021-08-26 RX ORDER — LANOLIN ALCOHOL/MO/W.PET/CERES
CREAM (GRAM) TOPICAL DAILY
COMMUNITY

## 2021-08-26 NOTE — PROGRESS NOTES
Bariatric Postoperative Progress Note    CC: Black Stools    Nigel Herrera is a 27 y.o. female now 1 weeks status post laparoscopic gastric bypass surgery performed on 8/16/21. Doing well overall. Currently eating eggs, refried beans,. Taking in 70-80 oz water,  30 g protein. 30-45 min of activity 7 days a week. The patient is not having any pain. She is compliant with multivitamins, calcium, Vit D and B12 supplements. PO day 4 started having black specks in stool. Having 2 soft BMs daily and are now completely black and tarry. She was told by the nurse to stop taking Lovenox on Monday. Denies any salicylate medications, fatigue, SOB, dizziness, palpitations. Weight Loss Metrics 8/26/2021 8/26/2021 8/16/2021 8/6/2021 7/22/2021 7/22/2021 5/7/2021   Pre op / Initial Wt 352 - - - 352 - 367   Today's Wt - 329 lb - 347 lb - 352 lb -   BMI - 54.75 kg/m2 57.74 kg/m2 - - 58.58 kg/m2 -   Ideal Body Wt 134 - - - 134 - 134   Excess Body Wt 218 - - - 218 - 233   Goal Wt 178 - - - 178 - 180   Wt loss to date 23 - - - 0 - 0   % Wt Loss 0.13 - - - 0 - 0   80% .4 - - - 174.4 - 186.4       Past Medical History:   Diagnosis Date    Borderline hypertension     Hashimoto's thyroiditis     Hypercholesterolemia     Hypertension     Nausea & vomiting     vomiting when wakes, IV zofran not effective    PCOS (polycystic ovarian syndrome)     Prediabetes     Thyroid disease        Past Surgical History:   Procedure Laterality Date    HX HEENT      tonsillectomy    HX WISDOM TEETH EXTRACTION  2019       Current Outpatient Medications   Medication Sig Dispense Refill    calcium citrate 200 mg (950 mg) tablet Take  by mouth daily.  thiamine HCL (Vitamin B-1) 100 mg tablet Take  by mouth daily.  cyanocobalamin 1,000 mcg tablet Take 1,000 mcg by mouth daily.  multivitamin (ONE A DAY) tablet Take 1 Tablet by mouth daily.       ondansetron (ZOFRAN ODT) 4 mg disintegrating tablet Take 1 Tablet by mouth every eight (8) hours as needed for Nausea or Vomiting. 12 Tablet 0    cholecalciferol, vitamin D3, (Vitamin D3) 100 mcg (4,000 unit) cap Take  by mouth daily.  Tirosint 125 mcg cap 2 Tabs daily.  enoxaparin (LOVENOX) 40 mg/0.4 mL 0.4 mL by SubCUTAneous route daily. 7 Syringe 0       Allergies   Allergen Reactions    Latex Itching    Pcn [Penicillins] Anaphylaxis       ROS:  Review of Systems   Constitutional: Positive for weight loss. Negative for malaise/fatigue. Respiratory: Negative for cough and shortness of breath. Cardiovascular: Negative for chest pain, palpitations, orthopnea and leg swelling. Gastrointestinal: Positive for melena. Negative for abdominal pain, constipation, diarrhea, heartburn, nausea and vomiting. Genitourinary: Negative. Neurological: Negative for dizziness, loss of consciousness, weakness and headaches. Physicial Exam:  Visit Vitals  /71 (BP 1 Location: Left upper arm, BP Patient Position: Sitting)   Pulse (!) 105   Temp 97.3 °F (36.3 °C)   Ht 5' 5\" (1.651 m)   Wt 149.2 kg (329 lb)   LMP 08/26/2021 (Exact Date)   SpO2 94%   BMI 54.75 kg/m²     Physical Exam  Vitals and nursing note reviewed. Constitutional:       Appearance: She is obese. HENT:      Head: Normocephalic and atraumatic. Pulmonary:      Effort: Pulmonary effort is normal.   Abdominal:      General: Bowel sounds are normal. There is no distension. Palpations: Abdomen is soft. There is no mass. Tenderness: There is no abdominal tenderness. Hernia: No hernia is present. Skin:     General: Skin is warm and dry. Comments: Appropriately healing surgical incisions   Neurological:      General: No focal deficit present. Mental Status: She is alert and oriented to person, place, and time.    Psychiatric:         Mood and Affect: Mood normal.         Behavior: Behavior normal.       Assessment/Plan:   Unable to find in chart who told her to stop Lovenox, and pt was unable to remember name. Melena most likely due to healing tissue at anastomosis site. Will order CBC to rule out active bleed and call with results. Removed steri strips on surgical incisions, healing well. Continue to wash with soap and water. Drink at least 64 oz SF fluids, continue with bariatric diet progression. Proceed with follow up appointment with Dr. Keisha Sykes. The primary encounter diagnosis was Melena. Diagnoses of Morbid obesity with BMI of 50.0-59.9, adult ([address] Post-resection malabsorption, and S/P gastric bypass were also pertinent to this visit.        Ale Flynn NP

## 2021-09-09 ENCOUNTER — OFFICE VISIT (OUTPATIENT)
Dept: SURGERY | Age: 31
End: 2021-09-09
Payer: OTHER GOVERNMENT

## 2021-09-09 VITALS
HEART RATE: 90 BPM | DIASTOLIC BLOOD PRESSURE: 85 MMHG | BODY MASS INDEX: 48.82 KG/M2 | SYSTOLIC BLOOD PRESSURE: 142 MMHG | OXYGEN SATURATION: 98 % | WEIGHT: 293 LBS | TEMPERATURE: 97.2 F | HEIGHT: 65 IN

## 2021-09-09 DIAGNOSIS — K91.2 POSTOPERATIVE INTESTINAL MALABSORPTION: Primary | ICD-10-CM

## 2021-09-09 PROCEDURE — 99024 POSTOP FOLLOW-UP VISIT: CPT | Performed by: SURGERY

## 2021-09-09 NOTE — PROGRESS NOTES
Bariatric Follow-Up Evaluation    Tom Kenyon is a 27 y.o. white female status post laparoscopic gastric bypass, liver biopsy August 16, 2021 who presents in follow-up noting expected decrease incisional discomfort no longer requiring analgesics and otherwise without complaint. Tolerant of and compliant with an early post gastric bypass diet meeting both protein and fluid goals. Patient experiences appropriate early satiety, has no specific food intolerances or pathologic emesis and has not experienced dumping as she has avoided high density carbohydrates. Compliant with multivitamin, calcium citrate, vitamin B1, B12, and vitamin D supplementation  Activity: Walking 1 hour on a daily basis  Preop weight: 352  Current weight: 330. BMI: 55  Extra weight loss: 174  Current weight loss: 22  Goal weight: 178  Percentage weight loss: 13% / 22 days    Weight Loss Metrics 9/9/2021 9/9/2021 8/26/2021 8/26/2021 8/16/2021 8/6/2021 7/22/2021   Pre op / Initial Wt 352 - 352 - - - 352   Today's Wt - 330 lb - 329 lb - 347 lb -   BMI - 54.91 kg/m2 - 54.75 kg/m2 57.74 kg/m2 - -   Ideal Body Wt 134 - 134 - - - 134   Excess Body Wt 218 - 218 - - - 218   Goal Wt 177.6 - 178 - - - 178   Wt loss to date 22 - 23 - - - 0   % Wt Loss 0.13 - 0.13 - - - 0   80% .4 - 174.4 - - - 174.4       Allergies   Allergen Reactions    Latex Itching    Pcn [Penicillins] Anaphylaxis       Current Outpatient Medications on File Prior to Visit   Medication Sig Dispense Refill    calcium citrate 200 mg (950 mg) tablet Take  by mouth daily.  thiamine HCL (Vitamin B-1) 100 mg tablet Take  by mouth daily.  cyanocobalamin 1,000 mcg tablet Take 1,000 mcg by mouth daily.  multivitamin (ONE A DAY) tablet Take 1 Tablet by mouth daily.  ondansetron (ZOFRAN ODT) 4 mg disintegrating tablet Take 1 Tablet by mouth every eight (8) hours as needed for Nausea or Vomiting.  12 Tablet 0    cholecalciferol, vitamin D3, (Vitamin D3) 100 mcg (4,000 unit) cap Take  by mouth daily.  Tirosint 125 mcg cap 2 Tabs daily. No current facility-administered medications on file prior to visit. Past Medical History:   Diagnosis Date    Borderline hypertension     Hashimoto's thyroiditis     Hypercholesterolemia     Hypertension     Nausea & vomiting     vomiting when wakes, IV zofran not effective    PCOS (polycystic ovarian syndrome)     Prediabetes     Thyroid disease        Past Surgical History:   Procedure Laterality Date    HX HEENT      tonsillectomy    HX WISDOM TEETH EXTRACTION  2019       Social History     Tobacco Use    Smoking status: Never Smoker    Smokeless tobacco: Never Used   Substance Use Topics    Alcohol use: Not Currently    Drug use: Never       Family History   Problem Relation Age of Onset    Diabetes Mother        ROS:  General: Negative for fevers, chills, night sweats, fatigue, weight loss, or weight gain. GI: Negative for abdominal pain, change in bowel habits, hematochezia, melena, or GERD. Integumentary: Negative for dermatitis or abnormal moles. HEENT: Negative for visual changes, vertigo, epistaxis, dysphagia, or hoarseness    Cardiac: Negative for chest pain, palpitations, hypertension, edema, or varicosities    Resp: Negative for cough, shortness of breath, wheezing, hemoptysis, snoring, or reactive airway disease    : Negative for hematuria, dysuria, frequency, urgency, nocturia, or stress urinary incontinence    MSK:  Negative for weakness, joint pain, or arthritis    Endocrine: Negative for diabetes, thyroid disease, polyuria, polydipsia, polyphagia, poor wound, heat intolerance, or cold intolerance. Lymph/Heme: Negative for anemia, bruising, or history of blood transfusions. Neuro:  Negative for dizziness, headache, fainting, seizures, and stroke.     Psychiatry:  Negative for anxiety or depression    Physical Exam    Visit Vitals  BP (!) 142/85 (BP 1 Location: Right arm, BP Follow Up:  right 2nd toe osteo    Interval History/ROS: no complaints, feels fine    Allergies  No Known Allergies    ANTIMICROBIALS:  ampicillin/sulbactam  IVPB 3 every 8 hours      OTHER MEDS:  MEDICATIONS  (STANDING):  acetaminophen   Tablet .. 650 every 6 hours PRN  atorvastatin 40 at bedtime  carvedilol 12.5 every 12 hours  dextrose 40% Gel 15 once PRN  dextrose 50% Injectable 12.5 once  dextrose 50% Injectable 25 once  dextrose 50% Injectable 25 once  famotidine    Tablet 20 daily  furosemide    Tablet 40 daily  glucagon  Injectable 1 once PRN  insulin glargine Injectable (LANTUS) 38 at bedtime  insulin lispro (HumaLOG) corrective regimen sliding scale  three times a day before meals  insulin lispro (HumaLOG) corrective regimen sliding scale  at bedtime  insulin lispro Injectable (HumaLOG) 40 three times a day before meals  levothyroxine 125 daily  lisinopril 10 daily  niacin  at bedtime  predniSONE   Tablet 40 daily  spironolactone 25 daily      Vital Signs Last 24 Hrs  T(C): 36.6 (31 Aug 2020 16:47), Max: 36.8 (31 Aug 2020 05:33)  T(F): 97.9 (31 Aug 2020 16:47), Max: 98.3 (31 Aug 2020 05:33)  HR: 69 (31 Aug 2020 16:47) (56 - 69)  BP: 110/68 (31 Aug 2020 16:47) (90/61 - 110/68)  BP(mean): --  RR: 18 (31 Aug 2020 16:47) (17 - 18)  SpO2: 99% (31 Aug 2020 16:47) (98% - 100%)    PHYSICAL EXAM:  General: WN/WD NAD, Non-toxic  Neurology: A&Ox3, nonfocal  Respiratory: no resp distress  Abdominal: Soft, Non-tender, non-distended,   Extremities: No edema,  Line Sites: Clear  Skin: No rash                        8.8    2.90  )-----------( 19       ( 31 Aug 2020 06:58 )             27.1       08-31    135  |  103  |  67<H>  ----------------------------<  121<H>  4.0   |  23  |  2.18<H>    Ca    8.4      31 Aug 2020 06:59    MICROBIOLOGY:  .Abscess left foot wound  08-26-20   Moderate Staphylococcus aureus  Moderate Alpha hemolytic strep "Susceptibilities not performed"  --  Staphylococcus aureus      .Blood Blood-Peripheral  08-26-20   No Growth Final  --  --      .Blood Blood-Peripheral  08-26-20   No Growth Final  --  --      RADIOLOGY:    Gopi Grace MD; Division of Infectious Disease; Pager: [phone]; nights and weekends: [phone] Patient Position: Sitting, BP Cuff Size: Large adult)   Pulse 90   Temp 97.2 °F (36.2 °C) (Temporal)   Ht 5' 5\" (1.651 m)   Wt 149.7 kg (330 lb)   LMP 08/26/2021 (Exact Date)   SpO2 98%   BMI 54.91 kg/m²       Nursing note reviewed. General: 27 y.o. female is in no acute distress. Head: Normocephalic, atraumatic  Resp: Clear to auscultation bilaterally, no wheezing, rhonchi, or rales, excursions normal and symmetrical.  Cardio: Regular rate and rhythm, no murmurs, clicks, gallops, or rubs. No edema or varicosities. Abdomen: Obese, soft, nontender, nondistended, normoactive bowel sounds, no hernias, no hepatosplenomegaly, incisions clean dry approximated with appropriate surrounding induration incisional tenderness without evidence infectious complications or incisional hernia  Psych: Alert and oriented to person, place, and time. August 16 2021 pathology/liver biopsy: Marked steatosis with steatohepatitis, pericellular fibrosis    Impression    1. Status post laparoscopic gastric bypass August 16, 2021 with appropriate weight loss and comorbidity resolution  2.   Liver biopsy confirming steatosis, steatohepatitis        Plan    Formal bariatric nutrition evaluation for advancement of diet  Continue compliance with the early post gastric bypass diet with advancement as per bariatric nutrition, continue vitamin supplementation and exercise protocol, encourage monthly attendance at bariatric support group meetings  Follow-up November 2021 with labs for 3-month bariatric surgical evaluation  Gastroenterology consultation in regard to the liver pathology results

## 2021-09-28 ENCOUNTER — DOCUMENTATION ONLY (OUTPATIENT)
Dept: BARIATRICS/WEIGHT MGMT | Age: 31
End: 2021-09-28

## 2021-09-28 ENCOUNTER — HOSPITAL ENCOUNTER (OUTPATIENT)
Dept: BARIATRICS/WEIGHT MGMT | Age: 31
Discharge: HOME | End: 2021-09-28

## 2021-09-28 NOTE — PROGRESS NOTES
[facility]  POST-OP NUTRITION FOLLOW UP    Patient's Name: Milana Nieto  YOB: 1990  Surgery Date: 2021      Procedure: Gastric Bypass    Surgeon: Dr. Henry Van    Height: 5 f 5     Pre-Op Weight: 352     Current Weight: 315  Weight Lost: 37    BMI:  52.5    Attendance of support group:   When:   Why not:     Complications  Readmittance: None  Reoperations: None  Complications: None  IV Fluids: None  ER Trips: None    Problem Areas:   Nausea: Sometimes if she takes too big of a bite. Vomiting: None   Dumping Syndrome: None  Inadequate Protein: None, patient states she is drinking 1.5 per day. Inadequate Fluids: None, patient states she is getting between 80-90 ounces of fluid per day. Food Intolerance: None  Hunger: None  Constipation: None    Eating 3 Meals/Day: Yes  Portion Size at Meals: 1 ounce     Protein from Food:     Foods being consumed:  Breakfast: Time: 8:00 am:  Eggs or chicken    Lunch: Time: 12:00 pm:   Chicken or tuna  Dinner:  Time: 5-6 pm:   Chicken or tuna  In-between eating: None    Length of time for meals: 30 minutes    food/fluids: 30/30    Fluids: 80+ oz/day   Types of Fluids: water    MVI: [place] Complete    Number/Day: bid   Taken Separately: Yes    Vitamin B1: Yes  Dosin mg    Calcium: chewable from [employer]    Calcium Dosing: tid    Taken Separately: Yes    Vitamin B12: sublingual   Vitamin B12 Dosin mcg    Vitamin D: Yes     Vitamin D dosin IU    Iron: Will assess 3 month labs    Iron dosing:      Protein Supplement: Premier     Grams of Protein: 45 (1.5)30   Mixed with:      Splitting Protein Drink in 1/2:    Timing of Protein Drinks:   Patient is taking 7 days a week. Exercise: 30-60 minute daily walk. Comments:    Patient is doing very well at 6 weeks post op. Portions are appropriate to promote continued weight loss. Compliant with vitamins and protein shakes and is getting 45 grams. She is walking for 30-60 minutes daily. Patient was educated on the importance of eating meat and vegetables only. I have talked with patient about the effects of carbohydrates, not only from a weight management perspective, but also what effects it could have on their blood sugar and what reactive hypoglycemia is.         Diet Follow Up:  9 month class scheduled for: May 6 at 10:15 am    Mayur Preston [address]    9/28/2021

## 2021-11-02 ENCOUNTER — TELEPHONE (OUTPATIENT)
Dept: SURGERY | Age: 31
End: 2021-11-02

## 2021-11-02 NOTE — TELEPHONE ENCOUNTER
Patient with complaints of epigastric pain first noted on sat after eating chicken pain is with eating and drinking startds on left epigastric area radiates toward  center burning and stabbing discussed with dr Reynaga Pro and appt made with gi for nov 9th pt to focus on liquids and protein supplement and soft protein foods to call back asap for any increase or other issues

## 2021-11-10 ENCOUNTER — HOSPITAL ENCOUNTER (OUTPATIENT)
Dept: PREADMISSION TESTING | Age: 31
Discharge: HOME | End: 2021-11-10
Payer: OTHER GOVERNMENT

## 2021-11-10 LAB — SARS-COV-2, NAA: NOT DETECTED

## 2021-11-10 PROCEDURE — U0003 INFECTIOUS AGENT DETECTION BY NUCLEIC ACID (DNA OR RNA); SEVERE ACUTE RESPIRATORY SYNDROME CORONAVIRUS 2 (SARS-COV-2) (CORONAVIRUS DISEASE [COVID-19]), AMPLIFIED PROBE TECHNIQUE, MAKING USE OF HIGH THROUGHPUT TECHNOLOGIES AS DESCRIBED BY CMS-2020-01-R: HCPCS

## 2021-11-13 ENCOUNTER — ANESTHESIA EVENT (OUTPATIENT)
Dept: ENDOSCOPY | Age: 31
End: 2021-11-13
Payer: OTHER GOVERNMENT

## 2021-11-15 ENCOUNTER — HOSPITAL ENCOUNTER (OUTPATIENT)
Age: 31
Setting detail: OUTPATIENT SURGERY
Discharge: HOME | End: 2021-11-15
Attending: INTERNAL MEDICINE | Admitting: INTERNAL MEDICINE
Payer: OTHER GOVERNMENT

## 2021-11-15 ENCOUNTER — ANESTHESIA (OUTPATIENT)
Dept: ENDOSCOPY | Age: 31
End: 2021-11-15
Payer: OTHER GOVERNMENT

## 2021-11-15 VITALS
HEIGHT: 65 IN | BODY MASS INDEX: 48.65 KG/M2 | DIASTOLIC BLOOD PRESSURE: 72 MMHG | SYSTOLIC BLOOD PRESSURE: 109 MMHG | OXYGEN SATURATION: 100 % | TEMPERATURE: 99.2 F | WEIGHT: 292 LBS | HEART RATE: 83 BPM | RESPIRATION RATE: 17 BRPM

## 2021-11-15 LAB — HCG UR QL: NEGATIVE

## 2021-11-15 PROCEDURE — 74011250636 HC RX REV CODE- 250/636: Performed by: NURSE ANESTHETIST, CERTIFIED REGISTERED

## 2021-11-15 PROCEDURE — 00731 ANES UPR GI NDSC PX NOS: CPT | Performed by: NURSE ANESTHETIST, CERTIFIED REGISTERED

## 2021-11-15 PROCEDURE — 74011000250 HC RX REV CODE- 250: Performed by: NURSE ANESTHETIST, CERTIFIED REGISTERED

## 2021-11-15 PROCEDURE — 81025 URINE PREGNANCY TEST: CPT

## 2021-11-15 PROCEDURE — 00731 ANES UPR GI NDSC PX NOS: CPT | Performed by: ANESTHESIOLOGY

## 2021-11-15 PROCEDURE — 2709999900 HC NON-CHARGEABLE SUPPLY: Performed by: INTERNAL MEDICINE

## 2021-11-15 PROCEDURE — 77030008565 HC TBNG SUC IRR ERBE -B: Performed by: INTERNAL MEDICINE

## 2021-11-15 PROCEDURE — 76040000019: Performed by: INTERNAL MEDICINE

## 2021-11-15 PROCEDURE — 76060000031 HC ANESTHESIA FIRST 0.5 HR: Performed by: INTERNAL MEDICINE

## 2021-11-15 RX ORDER — SODIUM CHLORIDE, SODIUM LACTATE, POTASSIUM CHLORIDE, CALCIUM CHLORIDE 600; 310; 30; 20 MG/100ML; MG/100ML; MG/100ML; MG/100ML
75 INJECTION, SOLUTION INTRAVENOUS CONTINUOUS
Status: DISCONTINUED | OUTPATIENT
Start: 2021-11-15 | End: 2021-11-15 | Stop reason: HOSPADM

## 2021-11-15 RX ORDER — INSULIN LISPRO 100 [IU]/ML
INJECTION, SOLUTION INTRAVENOUS; SUBCUTANEOUS ONCE
Status: DISCONTINUED | OUTPATIENT
Start: 2021-11-15 | End: 2021-11-15 | Stop reason: HOSPADM

## 2021-11-15 RX ORDER — LIDOCAINE HYDROCHLORIDE 10 MG/ML
0.1 INJECTION, SOLUTION EPIDURAL; INFILTRATION; INTRACAUDAL; PERINEURAL AS NEEDED
Status: DISCONTINUED | OUTPATIENT
Start: 2021-11-15 | End: 2021-11-15 | Stop reason: HOSPADM

## 2021-11-15 RX ORDER — MAGNESIUM SULFATE 100 %
4 CRYSTALS MISCELLANEOUS AS NEEDED
Status: DISCONTINUED | OUTPATIENT
Start: 2021-11-15 | End: 2021-11-15 | Stop reason: HOSPADM

## 2021-11-15 RX ORDER — SODIUM CHLORIDE, SODIUM LACTATE, POTASSIUM CHLORIDE, CALCIUM CHLORIDE 600; 310; 30; 20 MG/100ML; MG/100ML; MG/100ML; MG/100ML
25 INJECTION, SOLUTION INTRAVENOUS CONTINUOUS
Status: DISCONTINUED | OUTPATIENT
Start: 2021-11-15 | End: 2021-11-15 | Stop reason: HOSPADM

## 2021-11-15 RX ORDER — DEXTROSE 50 % IN WATER (D50W) INTRAVENOUS SYRINGE
25-50 AS NEEDED
Status: DISCONTINUED | OUTPATIENT
Start: 2021-11-15 | End: 2021-11-15 | Stop reason: HOSPADM

## 2021-11-15 RX ORDER — SODIUM CHLORIDE 0.9 % (FLUSH) 0.9 %
5-40 SYRINGE (ML) INJECTION EVERY 8 HOURS
Status: DISCONTINUED | OUTPATIENT
Start: 2021-11-15 | End: 2021-11-15 | Stop reason: HOSPADM

## 2021-11-15 RX ORDER — SODIUM CHLORIDE 0.9 % (FLUSH) 0.9 %
5-40 SYRINGE (ML) INJECTION AS NEEDED
Status: DISCONTINUED | OUTPATIENT
Start: 2021-11-15 | End: 2021-11-15 | Stop reason: HOSPADM

## 2021-11-15 RX ORDER — PROPOFOL 10 MG/ML
INJECTION, EMULSION INTRAVENOUS AS NEEDED
Status: DISCONTINUED | OUTPATIENT
Start: 2021-11-15 | End: 2021-11-15 | Stop reason: HOSPADM

## 2021-11-15 RX ORDER — LIDOCAINE HYDROCHLORIDE 20 MG/ML
INJECTION, SOLUTION EPIDURAL; INFILTRATION; INTRACAUDAL; PERINEURAL AS NEEDED
Status: DISCONTINUED | OUTPATIENT
Start: 2021-11-15 | End: 2021-11-15 | Stop reason: HOSPADM

## 2021-11-15 RX ADMIN — FAMOTIDINE 20 MG: 10 INJECTION INTRAVENOUS at 10:06

## 2021-11-15 RX ADMIN — LIDOCAINE HYDROCHLORIDE 100 MG: 20 INJECTION, SOLUTION EPIDURAL; INFILTRATION; INTRACAUDAL; PERINEURAL at 10:35

## 2021-11-15 RX ADMIN — PROPOFOL 100 MG: 10 INJECTION, EMULSION INTRAVENOUS at 10:34

## 2021-11-15 RX ADMIN — PROPOFOL 20 MG: 10 INJECTION, EMULSION INTRAVENOUS at 10:35

## 2021-11-15 RX ADMIN — SODIUM CHLORIDE, SODIUM LACTATE, POTASSIUM CHLORIDE, AND CALCIUM CHLORIDE 75 ML/HR: 600; 310; 30; 20 INJECTION, SOLUTION INTRAVENOUS at 10:06

## 2021-11-15 RX ADMIN — PROPOFOL 80 MG: 10 INJECTION, EMULSION INTRAVENOUS at 10:33

## 2021-11-15 NOTE — ANESTHESIA PREPROCEDURE EVALUATION
Relevant Problems   No relevant active problems       Anesthetic History     PONV          Review of Systems / Medical History  Patient summary reviewed and pertinent labs reviewed    Pulmonary  Within defined limits                 Neuro/Psych   Within defined limits           Cardiovascular    Hypertension                   GI/Hepatic/Renal                Endo/Other      Hypothyroidism  Morbid obesity     Other Findings              Physical Exam    Airway  Mallampati: II  TM Distance: 4 - 6 cm  Neck ROM: normal range of motion   Mouth opening: Normal     Cardiovascular  Regular rate and rhythm,  S1 and S2 normal,  no murmur, click, rub, or gallop             Dental  No notable dental hx       Pulmonary  Breath sounds clear to auscultation               Abdominal  GI exam deferred       Other Findings            Anesthetic Plan    ASA: 3  Anesthesia type: MAC          Induction: Intravenous  Anesthetic plan and risks discussed with: Patient

## 2021-11-15 NOTE — DISCHARGE INSTRUCTIONS
Upper GI Endoscopy: What to Expect at [address] [place] [address]  After you have an endoscopy, you will stay at the hospital or clinic for 1 to 2 hours. This will allow the medicine to wear off. You will be able to go home after your doctor or nurse checks to make sure you are not having any problems. You may have to stay overnight if you had treatment during the test. You may have a sore throat for a day or two after the test.  This care sheet gives you a general idea about what to expect after the test.  How can you care for yourself at home? Activity  · Rest as much as you need to after you go home. · You should be able to go back to your usual activities the day after the test.  Diet  · Follow your doctor's directions for eating after the test.  · Drink plenty of fluids (unless your doctor has told you not to). Medications  · If you have a sore throat the day after the test, use an over-the-counter spray to numb your throat. Follow-up care is a key part of your treatment and safety. Be sure to make and go to all appointments, and call your doctor if you are having problems. It's also a good idea to know your test results and keep a list of the medicines you take. When should you call for help? Call 911 anytime you think you may need emergency care. For example, call if:  · You passed out (lost consciousness). · You cough up blood. · You vomit blood or what looks like coffee grounds. · You pass maroon or very bloody stools. Call your doctor now or seek immediate medical care if:  · You have trouble swallowing. · You have belly pain. · Your stools are black and tarlike or have streaks of blood. · You are sick to your stomach or cannot keep fluids down. Watch closely for changes in your health, and be sure to contact your doctor if:  · Your throat still hurts after a day or two. · You do not get better as expected. Where can you learn more?    Go to [employer]  Enter J454 in the search box to learn more about \"Upper GI Endoscopy: What to Expect at Home. \"   © 8838-1238 [employer]. Care instructions adapted under license by Tyra García (which disclaims liability or warranty for this information). This care instruction is for use with your licensed healthcare professional. If you have questions about a medical condition or this instruction, always ask your healthcare professional. [address] any warranty or liability for your use of this information. Content Version: 53.8.925894; Current as of: November 14, 2014    DISCHARGE SUMMARY from Nurse     POST-PROCEDURE INSTRUCTIONS:    Call your Physician if you:  ? Observe any excess bleeding. ? Develop a temperature over 100.5o F.  ? Experience abdominal, shoulder or chest pain. ? Notice any signs of decreased circulation or nerve impairment to an extremity such as a change in color, persistent numbness, tingling, coldness or increase in pain. ? Vomit blood or you have nausea and vomiting lasting longer than 4 hours. ? Are unable to take medications. ? Are unable to urinate within 8 hours after discharge following general anesthesia or intravenous sedation. For the next 24 hours after receiving general anesthesia or intravenous sedation, or while taking prescription Narcotics, limit your activities:  ? Do NOT drive a motor vehicle, operate hazard machinery or power tools, or perform tasks that require coordination. The medication you received during your procedure may have some effect on your mental awareness. ? Do NOT make important personal or business decisions. The medication you received during your procedure may have some effect on your mental awareness. ? Do NOT drink alcoholic beverages. These drinks do not mix well with the medications that have been given to you. ? Upon discharge from the hospital, you must be accompanied by a responsible adult. ?  Resume your diet as directed by your physician. ? Resume medications as your physician has prescribed. ? Please give a list of your current medications to your Primary Care Provider. ? Please update this list whenever your medications are discontinued, doses are changed, or new medications (including over-the-counter products) are added. ? Please carry medication information at all times in case of emergency situations. These are general instructions for a healthy lifestyle:    No smoking/ No tobacco products/ Avoid exposure to second hand smoke.  Surgeon General's Warning:  Quitting smoking now greatly reduces serious risk to your health. Obesity, smoking, and a sedentary lifestyle greatly increase your risk for illness.  A healthy diet, regular physical exercise & weight monitoring are important for maintaining a healthy lifestyle   You may be retaining fluid if you have a history of heart failure or if you experience any of the following symptoms:  Weight gain of 3 pounds or more overnight or 5 pounds in a week, increased swelling in our hands or feet or shortness of breath while lying flat in bed. Please call your doctor as soon as you notice any of these symptoms; do not wait until your next office visit. Colorectal Screening   Colorectal cancer almost always develops from precancerous polyps (abnormal growths) in the colon or rectum. Screening tests can find precancerous polyps, so that they can be removed before they turn into cancer. Screening tests can also find colorectal cancer early, when treatment works best.  Vasquez Speak with your physician about when you should begin screening and how often you should be tested  Vasquez   Additional Information    Educational references and/or instructions provided during this visit included:    See attached. APPOINTMENTS:    Per MD Instruction. Discharge information has been reviewed with the patient. The patient verbalized understanding.

## 2021-11-15 NOTE — H&P
[facility]    [email or web address]      Impression:   [email or web address]       Plan:     1. egd dil mac all risks benefits and alt discussed       Chief Complaint: dysphagia     HPI:  Deborah Garnica is a 27 y.o. female who is being seen on consult for dysphagia . PMH:   Past Medical History:   Diagnosis Date    Borderline hypertension     Hashimoto's thyroiditis     Hypercholesterolemia     Hypertension     Nausea & vomiting     vomiting when wakes, IV zofran not effective    PCOS (polycystic ovarian syndrome)     Prediabetes     Thyroid disease        PSH:   Past Surgical History:   Procedure Laterality Date    HX HEENT      tonsillectomy    HX WISDOM TEETH EXTRACTION  2019       Social HX:   Social History     Socioeconomic History    Marital status:      Spouse name: Not on file    Number of children: Not on file    Years of education: Not on file    Highest education level: Not on file   Occupational History    Not on file   Tobacco Use    Smoking status: Never Smoker    Smokeless tobacco: Never Used   Substance and Sexual Activity    Alcohol use: Not Currently    Drug use: Never    Sexual activity: Not on file   Other Topics Concern    Not on file   Social History Narrative    Not on file     Social Determinants of Health     Financial Resource Strain:     Difficulty of Paying Living Expenses: Not on file   Food Insecurity:     Worried About Running Out of Food in the Last Year: Not on file    Elen of Food in the Last Year: Not on file   Transportation Needs:     Lack of Transportation (Medical): Not on file    Lack of Transportation (Non-Medical):  Not on file   Physical Activity:     Days of Exercise per Week: Not on file    Minutes of Exercise per Session: Not on file   Stress:     Feeling of Stress : Not on file   Social Connections:     Frequency of Communication with Friends and Family: Not on file    Frequency of Social Gatherings with Friends and Family: Not on file    Attends Judaism Services: Not on file    Active Member of Clubs or Organizations: Not on file    Attends Club or Organization Meetings: Not on file    Marital Status: Not on file   Intimate Partner Violence:     Fear of Current or Ex-Partner: Not on file    Emotionally Abused: Not on file    Physically Abused: Not on file    Sexually Abused: Not on file   Housing Stability:     Unable to Pay for Housing in the Last Year: Not on file    Number of [place] in the Last Year: Not on file    Unstable Housing in the Last Year: Not on file       FHX:   Family History   Problem Relation Age of Onset    Diabetes Mother        Allergy:   Allergies   Allergen Reactions    Latex Itching    Pcn [Penicillins] Anaphylaxis       Home Medications:     Medications Prior to Admission   Medication Sig    calcium citrate 200 mg (950 mg) tablet Take  by mouth daily.  thiamine HCL (Vitamin B-1) 100 mg tablet Take  by mouth daily.  cyanocobalamin 1,000 mcg tablet Take 1,000 mcg by mouth daily.  multivitamin (ONE A DAY) tablet Take 1 Tablet by mouth daily.  ondansetron (ZOFRAN ODT) 4 mg disintegrating tablet Take 1 Tablet by mouth every eight (8) hours as needed for Nausea or Vomiting.  cholecalciferol, vitamin D3, (Vitamin D3) 100 mcg (4,000 unit) cap Take  by mouth daily.  Tirosint 125 mcg cap 2 Tabs daily. Review of Systems:     Constitutional: No fevers, chills, weight loss, fatigue. Skin: No rashes, pruritis, jaundice, ulcerations, erythema. HENT: No headaches, nosebleeds, sinus pressure, rhinorrhea, sore throat. Eyes: No visual changes, blurred vision, eye pain, photophobia, jaundice. Cardiovascular: No chest pain, heart palpitations. Respiratory: No cough, SOB, wheezing, chest discomfort, orthopnea. Gastrointestinal: dysphagia   Genitourinary: No dysuria, bleeding, discharge, pyuria.    Musculoskeletal: No weakness, arthralgias, wasting. Endo: No sweats. Heme: No bruising, easy bleeding. Allergies: As noted. Neurological: Cranial nerves intact. Alert and oriented. Gait not assessed. Psychiatric:  No anxiety, depression, hallucinations. Visit Vitals  BP (!) 142/96   Pulse 91   Temp 98.7 °F (37.1 °C)   Resp 20   Ht 5' 5\" (1.651 m)   Wt 132.5 kg (292 lb)   SpO2 97%   Breastfeeding No   BMI 48.59 kg/m²       Physical Assessment:     constitutional: appearance: well developed, well nourished, normal habitus, no deformities, in no acute distress. skin: inspection: no rashes, ulcers, icterus or other lesions; no clubbing or telangiectasias. palpation: no induration or subcutaneos nodules. eyes: inspection: normal conjunctivae and lids; no jaundice pupils: symmetrical, normoreactive to light, normal accommodation and size. ENMT: mouth: normal oral mucosa,lips and gums; good dentition. oropharynx: normal tongue, hard and soft palate; posterior pharynx without erythema, exudate or lesions. neck: no masses organomegaly or tenderness. respiratory: effort: normal chest excursion; no intercostal retraction or accessory muscle use. cardiovascular: abdominal aorta: normal size and position; no bruits. palpation: PMI of normal size and position; normal rhythm; no thrill or murmurs. abdominal: abdomen: normal consistency; no tenderness or masses. hernias: no hernias appreciated. liver: normal size and consistency. spleen: not palpable. rectal: hemoccult/guaiac: not performed. musculoskeletal: no deformities or muscle wasting   lymphatic: axilae: not palpable. groin: not palpable. neck: within normal limits. other: not palpable. neurologic: cranial nerves: II-XII normal.   psychiatric: judgement/insight: within normal limits. memory: within normal limits for recent and remote events. mood and affect: no evidence of depression, anxiety or agitation. orientation: oriented to time, space and person. Basic Metabolic Profile   No results for input(s): NA, K, CL, CO2, BUN, GLU, CA, MG, PHOS in the last 72 hours. No lab exists for component: CREAT      CBC w/Diff    No results for input(s): WBC, RBC, HGB, HCT, MCV, MCH, MCHC, RDW, PLT, HGBEXT, HCTEXT, PLTEXT in the last 72 hours. No lab exists for component: MPV No results for input(s): GRANS, LYMPH, EOS, PRO, MYELO, METAS, BLAST in the last 72 hours. No lab exists for component: MONO, BASO     Hepatic Function   No results for input(s): ALB, TP, TBILI, AP, AML, LPSE in the last 72 hours. No lab exists for component: DBILI, GPT, SGOT       Raysa Bae MD, M.D. [facility], [address] [place] [address]  [email or web address]

## 2021-11-16 ENCOUNTER — HOSPITAL ENCOUNTER (OUTPATIENT)
Dept: LAB | Age: 31
Discharge: HOME | End: 2021-11-16
Payer: OTHER GOVERNMENT

## 2021-11-16 DIAGNOSIS — K91.2 POSTOPERATIVE INTESTINAL MALABSORPTION: ICD-10-CM

## 2021-11-16 LAB
ALBUMIN SERPL-MCNC: 3.4 G/DL (ref 3.4–5)
CHOLEST SERPL-MCNC: 196 MG/DL
ERYTHROCYTE [DISTWIDTH] IN BLOOD BY AUTOMATED COUNT: 16.6 % (ref 11.6–14.5)
HCT VFR BLD AUTO: 43.2 % (ref 35–45)
HDLC SERPL-MCNC: 36 MG/DL (ref 40–60)
HDLC SERPL: 5.4 {RATIO} (ref 0–5)
HGB BLD-MCNC: 13.4 G/DL (ref 12–16)
IRON SERPL-MCNC: 40 UG/DL (ref 50–175)
LDLC SERPL CALC-MCNC: 125.6 MG/DL (ref 0–100)
LIPID PROFILE,FLP: ABNORMAL
MCH RBC QN AUTO: 27 PG (ref 24–34)
MCHC RBC AUTO-ENTMCNC: 31 G/DL (ref 31–37)
MCV RBC AUTO: 87.1 FL (ref 78–100)
NRBC # BLD: 0 K/UL (ref 0–0.01)
NRBC BLD-RTO: 0 PER 100 WBC
PLATELET # BLD AUTO: 344 K/UL (ref 135–420)
PMV BLD AUTO: 12 FL (ref 9.2–11.8)
RBC # BLD AUTO: 4.96 M/UL (ref 4.2–5.3)
TRIGL SERPL-MCNC: 172 MG/DL (ref ?–150)
VLDLC SERPL CALC-MCNC: 34.4 MG/DL
WBC # BLD AUTO: 9.6 K/UL (ref 4.6–13.2)

## 2021-11-16 PROCEDURE — 85027 COMPLETE CBC AUTOMATED: CPT

## 2021-11-16 PROCEDURE — 83540 ASSAY OF IRON: CPT

## 2021-11-16 PROCEDURE — 80061 LIPID PANEL: CPT

## 2021-11-16 PROCEDURE — 84425 ASSAY OF VITAMIN B-1: CPT

## 2021-11-16 PROCEDURE — 82040 ASSAY OF SERUM ALBUMIN: CPT

## 2021-11-16 PROCEDURE — 36415 COLL VENOUS BLD VENIPUNCTURE: CPT

## 2021-11-16 NOTE — ANESTHESIA POSTPROCEDURE EVALUATION
Procedure(s):  UPPER ENDOSCOPY. MAC    Anesthesia Post Evaluation      Multimodal analgesia: multimodal analgesia used between 6 hours prior to anesthesia start to PACU discharge  Patient location during evaluation: PACU  Patient participation: complete - patient participated  Level of consciousness: awake and alert  Pain management: adequate  Airway patency: patent  Anesthetic complications: no  Cardiovascular status: acceptable  Respiratory status: acceptable  Hydration status: acceptable  Post anesthesia nausea and vomiting:  controlled  Final Post Anesthesia Temperature Assessment:  Normothermia (36.0-37.5 degrees C)      INITIAL Post-op Vital signs:   Vitals Value Taken Time   /72 11/15/21 1121   Temp 37.3 °C (99.2 °F) 11/15/21 1046   Pulse 87 11/15/21 1124   Resp 18 11/15/21 1124   SpO2 99 % 11/15/21 1124   Vitals shown include unvalidated device data.

## 2021-11-22 LAB — VIT B1 BLD-SCNC: 178.6 NMOL/L (ref 66.5–200)

## 2021-11-23 ENCOUNTER — HOSPITAL ENCOUNTER (OUTPATIENT)
Dept: LAB | Age: 31
Discharge: HOME | End: 2021-11-23
Payer: OTHER GOVERNMENT

## 2021-11-23 ENCOUNTER — OFFICE VISIT (OUTPATIENT)
Dept: SURGERY | Age: 31
End: 2021-11-23
Payer: OTHER GOVERNMENT

## 2021-11-23 VITALS
WEIGHT: 289 LBS | SYSTOLIC BLOOD PRESSURE: 132 MMHG | TEMPERATURE: 97.3 F | HEART RATE: 78 BPM | HEIGHT: 65 IN | OXYGEN SATURATION: 97 % | BODY MASS INDEX: 48.15 KG/M2 | DIASTOLIC BLOOD PRESSURE: 79 MMHG

## 2021-11-23 DIAGNOSIS — E66.01 MORBID OBESITY (HCC): ICD-10-CM

## 2021-11-23 DIAGNOSIS — K91.2 POST-RESECTION MALABSORPTION: Primary | ICD-10-CM

## 2021-11-23 DIAGNOSIS — R13.10 DYSPHAGIA, UNSPECIFIED TYPE: ICD-10-CM

## 2021-11-23 DIAGNOSIS — Z98.84 S/P GASTRIC BYPASS: ICD-10-CM

## 2021-11-23 DIAGNOSIS — R10.12 LUQ PAIN: ICD-10-CM

## 2021-11-23 DIAGNOSIS — R11.0 NAUSEA: ICD-10-CM

## 2021-11-23 DIAGNOSIS — K91.2 POST-RESECTION MALABSORPTION: ICD-10-CM

## 2021-11-23 LAB
ALBUMIN SERPL-MCNC: 3.3 G/DL (ref 3.4–5)
ALBUMIN/GLOB SERPL: 0.9 {RATIO} (ref 0.8–1.7)
ALP SERPL-CCNC: 81 U/L (ref 45–117)
ALT SERPL-CCNC: 24 U/L (ref 13–56)
ANION GAP SERPL CALC-SCNC: 3 MMOL/L (ref 3–18)
AST SERPL-CCNC: 21 U/L (ref 10–38)
BILIRUB SERPL-MCNC: 0.7 MG/DL (ref 0.2–1)
BUN SERPL-MCNC: 5 MG/DL (ref 7–18)
BUN/CREAT SERPL: 8 (ref 12–20)
CALCIUM SERPL-MCNC: 8.3 MG/DL (ref 8.5–10.1)
CHLORIDE SERPL-SCNC: 106 MMOL/L (ref 100–111)
CO2 SERPL-SCNC: 32 MMOL/L (ref 21–32)
CREAT SERPL-MCNC: 0.62 MG/DL (ref 0.6–1.3)
GLOBULIN SER CALC-MCNC: 3.5 G/DL (ref 2–4)
GLUCOSE SERPL-MCNC: 103 MG/DL (ref 74–99)
POTASSIUM SERPL-SCNC: 3.8 MMOL/L (ref 3.5–5.5)
PROT SERPL-MCNC: 6.8 G/DL (ref 6.4–8.2)
SODIUM SERPL-SCNC: 141 MMOL/L (ref 136–145)

## 2021-11-23 PROCEDURE — 80053 COMPREHEN METABOLIC PANEL: CPT

## 2021-11-23 PROCEDURE — 36415 COLL VENOUS BLD VENIPUNCTURE: CPT

## 2021-11-23 PROCEDURE — 99213 OFFICE O/P EST LOW 20 MIN: CPT | Performed by: NURSE PRACTITIONER

## 2021-11-23 RX ORDER — ONDANSETRON 4 MG/1
4 TABLET, ORALLY DISINTEGRATING ORAL
Qty: 30 TABLET | Refills: 1 | Status: SHIPPED | OUTPATIENT
Start: 2021-11-23 | End: 2022-03-23

## 2021-11-23 NOTE — PROGRESS NOTES
Bariatric Postoperative Progress Note    CC: 3 Month LGBP Follow Up    Cedric Freeman is a 27 y.o. female now 3 months status post laparoscopic gastric bypass surgery performed on 8/16/21. Doing well overall. Currently eating protein shakes, chicken, fish, seafood, beans, broccoli, spinach, cheese. Taking in 70-90 oz water,  75 g protein. 60 min of cardio/strength 6-7 days a week. Bowel movements are regular. Pain is controlled without any medications. She is compliant with multivitamins, calcium, Vit D and B12 supplements. EGD with Dr. Zen Alvarez 11/15/2021 with normal gastric bypass anatomy. Recommended barium swallow to determine if gastroparesis is causing dysphagia. Also reporting nausea before eating and LUQ pain not related to PO intake.    Saw endo on 11/6, TSH 11.2, increased Tirosint to 250    Weight Loss Metrics 11/23/2021 11/23/2021 11/15/2021 9/9/2021 9/9/2021 8/26/2021 8/26/2021   Pre op / Initial Wt 352 - - 352 - 352 -   Today's Wt - 289 lb 292 lb - 330 lb - 329 lb   BMI - 48.09 kg/m2 48.59 kg/m2 - 54.91 kg/m2 - 54.75 kg/m2   Ideal Body Wt 134 - - 134 - 134 -   Excess Body Wt 218 - - 218 - 218 -   Goal Wt 178 - - 177.6 - 178 -   Wt loss to date 63 - - 22 - 23 -   % Wt Loss 0.36 - - 0.13 - 0.13 -   80% .4 - - 174.4 - 174.4 -     Comorbidities:  Hypertension: not applicable  Diabetes: not applicable  Obstructive Sleep Apnea: not applicable  Hyperlipidemia: not applicable  Stress Urinary Incontinence: not applicable  Gastroesophageal Reflux: not applicable  Weight related arthropathy:not applicable        Past Medical History:   Diagnosis Date    Borderline hypertension     Hashimoto's thyroiditis     Hypercholesterolemia     Hypertension     Nausea & vomiting     vomiting when wakes, IV zofran not effective    PCOS (polycystic ovarian syndrome)     Prediabetes     Thyroid disease        Past Surgical History:   Procedure Laterality Date    HX GASTRIC BYPASS  08/2021    HX HEENT tonsillectomy    HX WISDOM TEETH EXTRACTION  2019       Current Outpatient Medications   Medication Sig Dispense Refill    ondansetron (ZOFRAN ODT) 4 mg disintegrating tablet Take 1 Tablet by mouth every eight (8) hours as needed for Nausea or Vomiting. 30 Tablet 1    calcium citrate 200 mg (950 mg) tablet Take  by mouth daily.  thiamine HCL (Vitamin B-1) 100 mg tablet Take  by mouth daily.  cyanocobalamin 1,000 mcg tablet Take 1,000 mcg by mouth daily.  multivitamin (ONE A DAY) tablet Take 1 Tablet by mouth daily.  cholecalciferol, vitamin D3, (Vitamin D3) 100 mcg (4,000 unit) cap Take  by mouth daily.  Tirosint 125 mcg cap 2 Tabs daily. Allergies   Allergen Reactions    Latex Itching    Pcn [Penicillins] Anaphylaxis       ROS:  Review of Systems   Constitutional: Positive for malaise/fatigue and weight loss. Eyes: Negative. Respiratory: Negative for cough and shortness of breath. Cardiovascular: Negative for chest pain, palpitations and leg swelling. Gastrointestinal: Positive for abdominal pain (LUQ) and nausea. Negative for blood in stool, constipation, diarrhea, heartburn, melena and vomiting. Genitourinary: Negative. Neurological: Negative for dizziness, tingling, loss of consciousness, weakness and headaches. Physicial Exam:  Visit Vitals  /79 (BP 1 Location: Left upper arm, BP Patient Position: Sitting)   Pulse 78   Temp 97.3 °F (36.3 °C)   Ht 5' 5\" (1.651 m)   Wt 131.1 kg (289 lb)   LMP 10/27/2021 (Exact Date)   SpO2 97%   BMI 48.09 kg/m²     Physical Exam  Vitals and nursing note reviewed. Constitutional:       Appearance: She is obese. HENT:      Head: Normocephalic and atraumatic. Cardiovascular:      Rate and Rhythm: Normal rate. Pulses: Normal pulses. Heart sounds: Normal heart sounds. Pulmonary:      Effort: Pulmonary effort is normal.      Breath sounds: Normal breath sounds.    Abdominal:      General: Bowel sounds are normal. There is no distension. Palpations: Abdomen is soft. There is mass (Small moveable nodule in LUQ). Tenderness: There is abdominal tenderness. Hernia: No hernia is present. Musculoskeletal:         General: Normal range of motion. Skin:     General: Skin is warm and dry. Neurological:      General: No focal deficit present. Mental Status: She is alert and oriented to person, place, and time.    Psychiatric:         Mood and Affect: Mood normal.         Behavior: Behavior normal.         Labs:   Recent Results (from the past 672 hour(s))   SARS-COV-2 BY PRASANTH    Collection Time: 11/10/21  9:42 AM   Result Value Ref Range    SARS-CoV-2, PRASANTH Not detected NOTD     HCG URINE, QL. - POC    Collection Time: 11/15/21 10:15 AM   Result Value Ref Range    Pregnancy test,urine (POC) Negative NEG     ALBUMIN    Collection Time: 11/16/21  8:22 AM   Result Value Ref Range    Albumin 3.4 3.4 - 5.0 g/dL   VITAMIN B1, WHOLE BLOOD    Collection Time: 11/16/21  8:22 AM   Result Value Ref Range    Vitamin B1 178.6 66.5 - 200.0 nmol/L   CBC W/O DIFF    Collection Time: 11/16/21  8:22 AM   Result Value Ref Range    WBC 9.6 4.6 - 13.2 K/uL    RBC 4.96 4.20 - 5.30 M/uL    HGB 13.4 12.0 - 16.0 g/dL    HCT 43.2 35.0 - 45.0 %    MCV 87.1 78.0 - 100.0 FL    MCH 27.0 24.0 - 34.0 PG    MCHC 31.0 31.0 - 37.0 g/dL    RDW 16.6 (H) 11.6 - 14.5 %    PLATELET 841 558 - 312 K/uL    MPV 12.0 (H) 9.2 - 11.8 FL    NRBC 0.0 0  WBC    ABSOLUTE NRBC 0.00 0.00 - 0.01 K/uL   LIPID PANEL    Collection Time: 11/16/21  8:22 AM   Result Value Ref Range    LIPID PROFILE          Cholesterol, total 196 <200 MG/DL    Triglyceride 172 (H) <150 MG/DL    HDL Cholesterol 36 (L) 40 - 60 MG/DL    LDL, calculated 125.6 (H) 0 - 100 MG/DL    VLDL, calculated 34.4 MG/DL    CHOL/HDL Ratio 5.4 (H) 0 - 5.0     IRON    Collection Time: 11/16/21  8:22 AM   Result Value Ref Range    Iron 40 (L) 50 - 175 ug/dL       Assessment/Plan:   She is currently 3 months s/p laparoscopic gastric bypass surgery with a total weight loss of 63 lbs to date, doing well. Less than anticipated weight loss most likely due to elevated TSH. Labs were reviewed and pt was instructed to continue MVI/B1/B12/D supplementation. Start taking 65 mg iron daily. LUQ pain may be scar tissue formation after surgery, palpable nodule in area of concern was tender and close to surgical site. Will order CT to evaluate. CMP ordered. Proceed with GI workup for dysphagia. Will order Zofran for nausea. We have reviewed the components of a successful postoperative course including requirement for a high protein, low carbohydrate diet, 64 oz a day of zero calorie liquids, daily vitamin supplementation, daily exercise (150 mis/week), regular follow-up, and participation in support groups. Refer to registered dietitian for more detailed medical nutrition therapy as needed. The primary encounter diagnosis was Post-resection malabsorption. Diagnoses of S/P gastric bypass, Morbid obesity ([address] BMI 45.0-49.9, adult ([address] Nausea, Dysphagia, unspecified type, and LUQ pain were also pertinent to this visit. Follow-up and Dispositions    · Return in about 3 months (around 2/23/2022). with labs, sooner as needed.   Mali Mancilla NP

## 2021-11-26 ENCOUNTER — TRANSCRIBE ORDER (OUTPATIENT)
Dept: SCHEDULING | Age: 31
End: 2021-11-26

## 2021-11-26 DIAGNOSIS — Z98.84 HISTORY OF ROUX-EN-Y GASTRIC BYPASS: ICD-10-CM

## 2021-11-26 DIAGNOSIS — R13.19 ESOPHAGEAL DYSPHAGIA: Primary | ICD-10-CM

## 2022-03-15 ENCOUNTER — HOSPITAL ENCOUNTER (OUTPATIENT)
Dept: GENERAL RADIOLOGY | Age: 32
Discharge: HOME | End: 2022-03-15
Attending: INTERNAL MEDICINE
Payer: OTHER GOVERNMENT

## 2022-03-15 DIAGNOSIS — Z98.84 HISTORY OF ROUX-EN-Y GASTRIC BYPASS: ICD-10-CM

## 2022-03-15 DIAGNOSIS — R13.19 ESOPHAGEAL DYSPHAGIA: ICD-10-CM

## 2022-03-15 PROCEDURE — 74220 X-RAY XM ESOPHAGUS 1CNTRST: CPT

## 2022-03-15 PROCEDURE — 74011000250 HC RX REV CODE- 250: Performed by: INTERNAL MEDICINE

## 2022-03-15 RX ADMIN — BARIUM SULFATE 700 MG: 700 TABLET ORAL at 11:00

## 2022-03-15 RX ADMIN — BARIUM SULFATE 176 G: 960 POWDER, FOR SUSPENSION ORAL at 11:00

## 2022-03-15 RX ADMIN — BARIUM SULFATE 135 ML: 980 POWDER, FOR SUSPENSION ORAL at 11:00

## 2022-03-18 PROBLEM — E66.01 MORBID OBESITY WITH BMI OF 50.0-59.9, ADULT (HCC): Status: ACTIVE | Noted: 2021-08-16

## 2022-03-21 ENCOUNTER — HOSPITAL ENCOUNTER (OUTPATIENT)
Dept: LAB | Age: 32
Discharge: HOME | End: 2022-03-21
Payer: OTHER GOVERNMENT

## 2022-03-21 DIAGNOSIS — R10.12 LUQ PAIN: ICD-10-CM

## 2022-03-21 DIAGNOSIS — K91.2 POST-RESECTION MALABSORPTION: ICD-10-CM

## 2022-03-21 DIAGNOSIS — R11.0 NAUSEA: ICD-10-CM

## 2022-03-21 DIAGNOSIS — E66.01 MORBID OBESITY (HCC): ICD-10-CM

## 2022-03-21 DIAGNOSIS — R13.10 DYSPHAGIA, UNSPECIFIED TYPE: ICD-10-CM

## 2022-03-21 DIAGNOSIS — Z98.84 S/P GASTRIC BYPASS: ICD-10-CM

## 2022-03-21 LAB
25(OH)D3 SERPL-MCNC: 37.5 NG/ML (ref 30–100)
ALBUMIN SERPL-MCNC: 3.6 G/DL (ref 3.4–5)
ALBUMIN/GLOB SERPL: 1.2 {RATIO} (ref 0.8–1.7)
ALP SERPL-CCNC: 72 U/L (ref 45–117)
ALT SERPL-CCNC: 18 U/L (ref 13–56)
ANION GAP SERPL CALC-SCNC: 8 MMOL/L (ref 3–18)
AST SERPL-CCNC: 14 U/L (ref 10–38)
BILIRUB SERPL-MCNC: 0.7 MG/DL (ref 0.2–1)
BUN SERPL-MCNC: 14 MG/DL (ref 7–18)
BUN/CREAT SERPL: 19 (ref 12–20)
CALCIUM SERPL-MCNC: 8.4 MG/DL (ref 8.5–10.1)
CHLORIDE SERPL-SCNC: 102 MMOL/L (ref 100–111)
CO2 SERPL-SCNC: 26 MMOL/L (ref 21–32)
CREAT SERPL-MCNC: 0.75 MG/DL (ref 0.6–1.3)
ERYTHROCYTE [DISTWIDTH] IN BLOOD BY AUTOMATED COUNT: 16.2 % (ref 11.6–14.5)
FERRITIN SERPL-MCNC: 67 NG/ML (ref 8–388)
FOLATE SERPL-MCNC: 4.4 NG/ML (ref 3.1–17.5)
GLOBULIN SER CALC-MCNC: 3.1 G/DL (ref 2–4)
GLUCOSE SERPL-MCNC: 83 MG/DL (ref 74–99)
HCT VFR BLD AUTO: 39.4 % (ref 35–45)
HGB BLD-MCNC: 12.8 G/DL (ref 12–16)
IRON SERPL-MCNC: 48 UG/DL (ref 50–175)
MCH RBC QN AUTO: 28.7 PG (ref 24–34)
MCHC RBC AUTO-ENTMCNC: 32.5 G/DL (ref 31–37)
MCV RBC AUTO: 88.3 FL (ref 78–100)
NRBC # BLD: 0 K/UL (ref 0–0.01)
NRBC BLD-RTO: 0 PER 100 WBC
PLATELET # BLD AUTO: 300 K/UL (ref 135–420)
PMV BLD AUTO: 11.9 FL (ref 9.2–11.8)
POTASSIUM SERPL-SCNC: 3.8 MMOL/L (ref 3.5–5.5)
PROT SERPL-MCNC: 6.7 G/DL (ref 6.4–8.2)
RBC # BLD AUTO: 4.46 M/UL (ref 4.2–5.3)
SODIUM SERPL-SCNC: 136 MMOL/L (ref 136–145)
VIT B12 SERPL-MCNC: 336 PG/ML (ref 211–911)
WBC # BLD AUTO: 11 K/UL (ref 4.6–13.2)

## 2022-03-21 PROCEDURE — 82607 VITAMIN B-12: CPT

## 2022-03-21 PROCEDURE — 85027 COMPLETE CBC AUTOMATED: CPT

## 2022-03-21 PROCEDURE — 82306 VITAMIN D 25 HYDROXY: CPT

## 2022-03-21 PROCEDURE — 80053 COMPREHEN METABOLIC PANEL: CPT

## 2022-03-21 PROCEDURE — 84425 ASSAY OF VITAMIN B-1: CPT

## 2022-03-21 PROCEDURE — 36415 COLL VENOUS BLD VENIPUNCTURE: CPT

## 2022-03-21 PROCEDURE — 83540 ASSAY OF IRON: CPT

## 2022-03-21 PROCEDURE — 82728 ASSAY OF FERRITIN: CPT

## 2022-03-22 ENCOUNTER — HOSPITAL ENCOUNTER (OUTPATIENT)
Dept: CT IMAGING | Age: 32
Discharge: HOME | End: 2022-03-22
Attending: NURSE PRACTITIONER
Payer: OTHER GOVERNMENT

## 2022-03-22 DIAGNOSIS — E66.01 MORBID OBESITY (HCC): ICD-10-CM

## 2022-03-22 DIAGNOSIS — R13.10 DYSPHAGIA, UNSPECIFIED TYPE: ICD-10-CM

## 2022-03-22 DIAGNOSIS — Z98.84 S/P GASTRIC BYPASS: ICD-10-CM

## 2022-03-22 DIAGNOSIS — K91.2 POST-RESECTION MALABSORPTION: ICD-10-CM

## 2022-03-22 DIAGNOSIS — R11.0 NAUSEA: ICD-10-CM

## 2022-03-22 DIAGNOSIS — R10.12 LUQ PAIN: ICD-10-CM

## 2022-03-22 PROCEDURE — 74160 CT ABDOMEN W/CONTRAST: CPT

## 2022-03-22 PROCEDURE — 74011000636 HC RX REV CODE- 636: Performed by: NURSE PRACTITIONER

## 2022-03-22 RX ADMIN — IOPAMIDOL 100 ML: 612 INJECTION, SOLUTION INTRAVENOUS at 10:52

## 2022-03-23 ENCOUNTER — OFFICE VISIT (OUTPATIENT)
Dept: SURGERY | Age: 32
End: 2022-03-23
Payer: OTHER GOVERNMENT

## 2022-03-23 VITALS
DIASTOLIC BLOOD PRESSURE: 85 MMHG | BODY MASS INDEX: 39.32 KG/M2 | HEIGHT: 65 IN | HEART RATE: 74 BPM | TEMPERATURE: 97.2 F | SYSTOLIC BLOOD PRESSURE: 137 MMHG | WEIGHT: 236 LBS | OXYGEN SATURATION: 98 %

## 2022-03-23 DIAGNOSIS — R10.12 LUQ PAIN: ICD-10-CM

## 2022-03-23 DIAGNOSIS — R11.0 NAUSEA: ICD-10-CM

## 2022-03-23 DIAGNOSIS — G47.00 INSOMNIA, UNSPECIFIED TYPE: ICD-10-CM

## 2022-03-23 DIAGNOSIS — E66.9 OBESITY (BMI 30-39.9): ICD-10-CM

## 2022-03-23 DIAGNOSIS — K91.2 POST-RESECTION MALABSORPTION: Primary | ICD-10-CM

## 2022-03-23 DIAGNOSIS — Z98.84 S/P GASTRIC BYPASS: ICD-10-CM

## 2022-03-23 PROCEDURE — 99214 OFFICE O/P EST MOD 30 MIN: CPT | Performed by: NURSE PRACTITIONER

## 2022-03-23 RX ORDER — LANOLIN ALCOHOL/MO/W.PET/CERES
CREAM (GRAM) TOPICAL
COMMUNITY

## 2022-03-23 RX ORDER — ONDANSETRON 4 MG/1
4 TABLET, ORALLY DISINTEGRATING ORAL
Qty: 30 TABLET | Refills: 2 | Status: SHIPPED | OUTPATIENT
Start: 2022-03-23

## 2022-03-23 RX ORDER — LEVONORGESTREL 52 MG/1
1 INTRAUTERINE DEVICE INTRAUTERINE ONCE
COMMUNITY

## 2022-03-23 RX ORDER — HYDROXYZINE 50 MG/1
TABLET, FILM COATED ORAL
Qty: 30 TABLET | Refills: 2 | Status: SHIPPED | OUTPATIENT
Start: 2022-03-23

## 2022-03-23 NOTE — PROGRESS NOTES
Bariatric Postoperative Progress Note    CC: 7 Month LGBP Follow Up    Sheila Marr is a 27 y.o. female now 6 months status post laparoscopic gastric bypass surgery performed on 8/16/21. Doing well overall. Currently eating beans, seafood, chicken, beef, salad, broccoli, tomatoes, cucumbers, green beans, orange, occ sweets. Taking in  oz water,  65-90 g protein. 60 min of running/strength 7 days a week. Bowel movements are regular. The patient is not having any pain. 100 [employer] once daily, BA Iron 60 mg, B12 1000 mcg SL, B1 100 mg, D 6000 units, Calcium 500 mg TID. Barium swallow ordered by Dr. Vj Garza was completed, showed normal changes after gastric bypass, she has yet to follow up with him in office. Still experiencing nausea, will be sporadic throughout the week, Zofran does help. Reporting new onset anxiety and panic attacks as well as insomnia. Still experiencing continuous dull achy LUQ abd pain, worse with some movement. Had CT performed yesterday, no results at time of visit. Denies any NSAID, ETOH, or tobacco use.      Weight Loss Metrics 3/23/2022 3/23/2022 11/23/2021 11/23/2021 11/15/2021 9/9/2021 9/9/2021   Pre op / Initial Wt 352 - 352 - - 352 -   Today's Wt - 236 lb - 289 lb 292 lb - 330 lb   BMI - 39.27 kg/m2 - 48.09 kg/m2 48.59 kg/m2 - 54.91 kg/m2   Ideal Body Wt 134 - 134 - - 134 -   Excess Body Wt 218 - 218 - - 218 -   Goal Wt 178 - 178 - - 177.6 -   Wt loss to date 116 - 63 - - 22 -   % Wt Loss 0.67 - 0.36 - - 0.13 -   80% .4 - 174.4 - - 174.4 -     Comorbidities:  Hypertension: not applicable  Diabetes: not applicable  Obstructive Sleep Apnea: not applicable  Hyperlipidemia: not applicable  Stress Urinary Incontinence: not applicable  Gastroesophageal Reflux: not applicable  Weight related arthropathy:not applicable        Past Medical History:   Diagnosis Date    Borderline hypertension     Hashimoto's thyroiditis     Hypercholesterolemia  Hypertension     Nausea & vomiting     vomiting when wakes, IV zofran not effective    PCOS (polycystic ovarian syndrome)     Prediabetes     Thyroid disease        Past Surgical History:   Procedure Laterality Date    HX GASTRIC BYPASS  08/2021    HX HEENT      tonsillectomy    HX WISDOM TEETH EXTRACTION  2019       Current Outpatient Medications   Medication Sig Dispense Refill    ferrous sulfate (Iron) 325 mg (65 mg iron) tablet Take  by mouth Daily (before breakfast).  ondansetron (ZOFRAN ODT) 4 mg disintegrating tablet Take 1 Tablet by mouth every eight (8) hours as needed for Nausea or Vomiting. 30 Tablet 2    hydrOXYzine HCL (ATARAX) 50 mg tablet Take one tab PO TID PRN for anxiety or sleep. 30 Tablet 2    levonorgestreL (Mirena) 20 mcg/24 hours (7 yrs) 52 mg IUD 1 Device by IntraUTERine route once.  calcium citrate 200 mg (950 mg) tablet Take  by mouth daily.  thiamine HCL (Vitamin B-1) 100 mg tablet Take  by mouth daily.  cyanocobalamin 1,000 mcg tablet Take 1,000 mcg by mouth daily.  multivitamin (ONE A DAY) tablet Take 1 Tablet by mouth daily.  cholecalciferol, vitamin D3, (Vitamin D3) 100 mcg (4,000 unit) cap Take  by mouth daily.  Tirosint 125 mcg cap 2 Tabs daily. Allergies   Allergen Reactions    Latex Itching    Pcn [Penicillins] Anaphylaxis       ROS:  Review of Systems   Constitutional: Positive for weight loss. Negative for malaise/fatigue. Eyes: Negative. Respiratory: Negative. Cardiovascular: Negative for chest pain and palpitations. Gastrointestinal: Positive for abdominal pain and nausea. Negative for blood in stool, constipation, diarrhea, heartburn, melena and vomiting. Genitourinary: Negative. Skin: Negative. Neurological: Negative for dizziness, tingling, loss of consciousness, weakness and headaches. Psychiatric/Behavioral: The patient has insomnia.          Anxiety, panic attacks     Physicial Exam:  Visit Vitals  /85 (BP 1 Location: Left upper arm, BP Patient Position: Sitting)   Pulse 74   Temp 97.2 °F (36.2 °C)   Ht 5' 5\" (1.651 m)   Wt 107 kg (236 lb)   SpO2 98%   BMI 39.27 kg/m²     Physical Exam  Vitals and nursing note reviewed. Constitutional:       Appearance: She is obese. HENT:      Head: Normocephalic and atraumatic. Cardiovascular:      Rate and Rhythm: Normal rate. Pulses: Normal pulses. Heart sounds: Normal heart sounds. Pulmonary:      Effort: Pulmonary effort is normal.      Breath sounds: Normal breath sounds. Abdominal:      General: Bowel sounds are normal. There is no distension. Palpations: Abdomen is soft. There is mass (small palpaple movable nodule LUQ/flank). Tenderness: There is no abdominal tenderness. Hernia: No hernia is present. Musculoskeletal:         General: Normal range of motion. Skin:     General: Skin is warm and dry. Neurological:      General: No focal deficit present. Mental Status: She is alert and oriented to person, place, and time.    Psychiatric:         Mood and Affect: Mood normal.         Behavior: Behavior normal.       Labs:   Recent Results (from the past 672 hour(s))   CBC W/O DIFF    Collection Time: 03/21/22  8:14 AM   Result Value Ref Range    WBC 11.0 4.6 - 13.2 K/uL    RBC 4.46 4.20 - 5.30 M/uL    HGB 12.8 12.0 - 16.0 g/dL    HCT 39.4 35.0 - 45.0 %    MCV 88.3 78.0 - 100.0 FL    MCH 28.7 24.0 - 34.0 PG    MCHC 32.5 31.0 - 37.0 g/dL    RDW 16.2 (H) 11.6 - 14.5 %    PLATELET 779 593 - 856 K/uL    MPV 11.9 (H) 9.2 - 11.8 FL    NRBC 0.0 0  WBC    ABSOLUTE NRBC 0.00 0.00 - 0.01 K/uL   FERRITIN    Collection Time: 03/21/22  8:14 AM   Result Value Ref Range    Ferritin 67 8 - 388 NG/ML   IRON    Collection Time: 03/21/22  8:14 AM   Result Value Ref Range    Iron 48 (L) 50 - 508 ug/dL   METABOLIC PANEL, COMPREHENSIVE    Collection Time: 03/21/22  8:14 AM   Result Value Ref Range    Sodium 136 136 - 145 mmol/L Potassium 3.8 3.5 - 5.5 mmol/L    Chloride 102 100 - 111 mmol/L    CO2 26 21 - 32 mmol/L    Anion gap 8 3.0 - 18 mmol/L    Glucose 83 74 - 99 mg/dL    BUN 14 7.0 - 18 MG/DL    Creatinine 0.75 0.6 - 1.3 MG/DL    BUN/Creatinine ratio 19 12 - 20      GFR est AA >60 >60 ml/min/1.73m2    GFR est non-AA >60 >60 ml/min/1.73m2    Calcium 8.4 (L) 8.5 - 10.1 MG/DL    Bilirubin, total 0.7 0.2 - 1.0 MG/DL    ALT (SGPT) 18 13 - 56 U/L    AST (SGOT) 14 10 - 38 U/L    Alk. phosphatase 72 45 - 117 U/L    Protein, total 6.7 6.4 - 8.2 g/dL    Albumin 3.6 3.4 - 5.0 g/dL    Globulin 3.1 2.0 - 4.0 g/dL    A-G Ratio 1.2 0.8 - 1.7     VITAMIN B12 & FOLATE    Collection Time: 03/21/22  8:14 AM   Result Value Ref Range    Vitamin B12 336 211 - 911 pg/mL    Folate 4.4 3.10 - 17.50 ng/mL   VITAMIN D, 25 HYDROXY    Collection Time: 03/21/22  8:14 AM   Result Value Ref Range    Vitamin D 25-Hydroxy 37.5 30 - 100 ng/mL     XR BA SWALLOW ESOPHOGRAM 3/15/2022  Postoperative changes of  gastric bypass without fluoroscopic evidence of  complication. Assessment/Plan:   She is currently 6 months s/p laparoscopic gastric bypass surgery with a total weight loss of 116 lbs to date, doing well. Labs were reviewed and pt was instructed to continue MVI/B1/B12/D supplementation. Continue with current vitamin regimen, but add Beattie's Complete MVI once daily as well. Also discussed [facility] Bariatric MVI. Will call when CT resulted. Will prescribe Atarax to assist with anxiety and induction of sleep. Follow up with PCP if this does not improve. Will refill Zofran, follow up with Dr. Katelyn Dupont for further nausea evaluation. Also discussed possibly logging food and outliers for days she has nausea to determine if there is a pattern. Also discussed food fear for possible cause of nausea.    We have reviewed the components of a successful postoperative course including requirement for a high protein, low carbohydrate diet, 64 oz a day of zero calorie liquids, daily vitamin supplementation, daily exercise (150 mis/week), regular follow-up, and participation in support groups. Refer to registered dietitian for more detailed medical nutrition therapy as needed. The primary encounter diagnosis was Post-resection malabsorption. Diagnoses of S/P gastric bypass, Obesity (BMI 30-39.9), BMI 39.0-39.9,adult, LUQ pain, Nausea, and Insomnia, unspecified type were also pertinent to this visit. Follow-up and Dispositions    · Return in about 6 months (around 9/23/2022). with labs, sooner as needed.   Heather Nieto NP

## 2022-03-30 LAB — VIT B1 BLD-SCNC: 112.6 NMOL/L (ref 66.5–200)

## 2022-05-06 ENCOUNTER — DOCUMENTATION ONLY (OUTPATIENT)
Dept: BARIATRICS/WEIGHT MGMT | Age: 32
End: 2022-05-06

## 2022-05-06 ENCOUNTER — HOSPITAL ENCOUNTER (OUTPATIENT)
Dept: BARIATRICS/WEIGHT MGMT | Age: 32
Discharge: HOME | End: 2022-05-06

## 2022-05-06 NOTE — PROGRESS NOTES
[address] Loss Madalyn Booth [address] [facility], [address]      Patient's Name: Vi Wilder   Age: 34 y.o. YOB: 1990   Sex: female    Date:   5/6/2022    Height: 5 f 5 Weight:    222      Lbs. BMI: 37.0     Surgery Date: 0    Surgeon: Dr. Rohan Kraft    Starting Weight: 352   Last Recorded Weight:   Overall Pounds Lost: 130     Procedure: Gastric Bypass    Lowest Weight patient has achieved since surgery: 220    How long has patient been at this weight for: This week. Still losing weight    Other Pertinent Information:     Diet History (reported by patient on diet history form)    Do you smoke: None    Alcohol Intake: None drinks at a time, None times a week. Meals per day: 3    Portion sizes ~: palm size, sometimes smaller. She states she eats, gets full, and stops. She does not go back to it. Simple sugar intake: None    Experiencing dumping syndrome: She states she got sick before and does not eat any sugar now. Carbohydrate intake: She states she does not eat these as they make her sick to her stomach. She may eat beans. Symptoms that occur when carbohydrates are consumed: She states she will feel sick if she eats this    Ounces of fluid consumed per day:  ounces    Fluids being consumed: water with Daniel    Patient is only drinking protein drinks when she can't make a meal to eat    Patient is snacking on: Not regularly, but may have a yogurt or a cheese stick    Exercise History    Patient is doing running 3 miles a day and weight 3 x a week for exercise. Vitamins    Patient is taking [employer] 1 Multivitamins per day    Patient is taking Calcium in the form of chewable. Patient is taking 1500 mg per day. Patient is taking 1000 mcg of Vitamin B12. Patient is taking 5000 IU of Vitamin D 3 today. Patient is taking 100 mg of Vitamin B1. Summary: Weight loss is at 130 pounds.   I have reinforced the key diet principles to the patient. Patient was instructed to follow a 3 meal a day routine. I have reinforced the importance of filling up on meat and vegetables and avoiding carbohydrates. I have talked with patient about reactive hypoglycemia and although carbohydrates are not good from a weight-management point of view, they are actually very dangerous and should be avoided. If patient is getting hungry between meals focus on meat and vegetables and a list of food choices was reviewed with patient. Reinforced to patient the importance of being properly hydrated and the need to get 64 ounces of fluid in per day. Reinforced to patient the need to do 30 minutes of exercise per day. We also spent some time talking about the vitamins and the importance of taking them. Reinforced the dosage of vitamins to patient. Patient was reminded that the vitamins are lifelong. Other Pertinent Information: Attended 9 month post op class. Doing well. Reinforced key diet principles. May contact me with any additional questions.        Mayur Mcgraw [address] RD  5/6/2022

## 2023-07-18 NOTE — NURSE NAVIGATOR
Per MBSAQIP requirements:  Letter and email sent requesting follow up appointment. [facility]      Dear Patient,    Your health is our main concern. It is important for your health to have follow-up lab work and to see your surgeon at 3 months, 6 months and annually after your weight loss surgery. Additionally, the Department of bariatric Surgery at our hospital is a member of the Metabolic and Bariatric Surgery Accreditation and Quality Improvement Program [facility]). As a participant in this program, we gather information on the outcomes of our patients after surgery. Please call the office for a follow up appointment at [phone] [facility]) or [phone] [facility]). If you have moved out of the area or have changed surgeons please call us and let us know the name of your doctor. Your health and feedback are important to us. We greatly appreciate your response.        Thank you,  [facility] [address]

## 2024-06-22 NOTE — BRIEF OP NOTE
Brief Postoperative Note    Patient: Sarahy Rossi  YOB: 1990  MRN: [id number]    Date of Procedure: 8/16/2021     Pre-Op Diagnosis: 1. Morbid obesity ([address] [E66.01]  2. Hepatic steatosis    Post-Op Diagnosis: Same as preoperative diagnosis. Procedure(s):  1. LAPAROSCOPIC ZENIA-EN-Y GASTRIC BYPASS 2.   Laparoscopic left hepatic lobe wedge biopsy    Surgeon(s):  Alicia Brannon DO    Surgical Assistant: Surg Asst-1: Magdalene Sanders    Anesthesia: General     Estimated Blood Loss (mL): Minimal    Complications: None    Specimens:   ID Type Source Tests Collected by Time Destination   1 : Liver Wedge Biopsy Preservative Liver  Alicia Brannon DO 8/16/2021 0926 Pathology        Implants: * No implants in log *    Drains: * No LDAs found *    Findings: Hepatomegaly with morphologic appearance hepatic steatosis    Electronically Signed by Chaka Connolly DO on 8/16/2021 at 9:47 AM
[email or web address]

## (undated) DEVICE — SCISSORS ENDOSCP DIA5MM CRV MPLR CAUT W/ RATCH HNDL

## (undated) DEVICE — MEDI-VAC NON-CONDUCTIVE SUCTION TUBING: Brand: CARDINAL HEALTH

## (undated) DEVICE — GLOVE SURG SZ 7 L11.33IN FNGR THK9.8MIL STRW LTX POLYMER

## (undated) DEVICE — SOLUTION IRRIG 1000ML H2O STRL BLT

## (undated) DEVICE — REM POLYHESIVE ADULT PATIENT RETURN ELECTRODE: Brand: VALLEYLAB

## (undated) DEVICE — FLEX ADVANTAGE 3000CC: Brand: FLEX ADVANTAGE

## (undated) DEVICE — STRIP,CLOSURE,WOUND,MEDI-STRIP,1/2X4: Brand: MEDLINE

## (undated) DEVICE — TROCAR ENDOSCP SHFT L100MM DIA12MM INTEGR STBL ENDOPATH

## (undated) DEVICE — BITE BLOCK ENDOSCP UNIV AD 6 TO 9.4 MM

## (undated) DEVICE — STERILE POLYISOPRENE POWDER-FREE SURGICAL GLOVES: Brand: PROTEXIS

## (undated) DEVICE — TRUE CONTENT TO BE POPULATED AS PART OF REBRANDING: Brand: ARGYLE

## (undated) DEVICE — TAPE ADH W3INXL10YD PLAS TRNSPAR H2O RESIST HYPOALRG CURAD

## (undated) DEVICE — STAPLER SKIN LN REINF 60 MM ECHELON ENDOPATH

## (undated) DEVICE — SHEAR HARMONIC ACET 5MMX36CM -- ACE PLUS

## (undated) DEVICE — 4-PORT MANIFOLD: Brand: NEPTUNE 2

## (undated) DEVICE — TISSUE RETRIEVAL SYSTEM: Brand: INZII RETRIEVAL SYSTEM

## (undated) DEVICE — FLUFF AND POLYMER UNDERPAD,EXTRA HEAVY: Brand: WINGS

## (undated) DEVICE — BLANKET WRM AD W50XL85.8IN PACU FULL BODY FORC AIR

## (undated) DEVICE — SUTURE VCRL SZ 3-0 L27IN ABSRB VLT L26MM SH 1/2 CIR J316H

## (undated) DEVICE — SOFT SILICONE HYDROCELLULAR SACRUM DRESSING WITH LOCK AWAY LAYER: Brand: ALLEVYN LIFE SACRUM (LARGE) PACK OF 10

## (undated) DEVICE — CATHETER SUCT TR FL TIP 14FR W/ O CTRL

## (undated) DEVICE — GAUZE SPONGES,8 PLY: Brand: CURITY

## (undated) DEVICE — ENDOSCOPY PUMP TUBING/ CAP SET: Brand: ERBE

## (undated) DEVICE — INTENDED FOR TISSUE SEPARATION, AND OTHER PROCEDURES THAT REQUIRE A SHARP SURGICAL BLADE TO PUNCTURE OR CUT.: Brand: BARD-PARKER SAFETY BLADES SIZE 11, STERILE

## (undated) DEVICE — MEDI-VAC SUCTION HIGH CAPACITY: Brand: CARDINAL HEALTH

## (undated) DEVICE — SET SUCT IRR TIP DISP STRYKEFLOW2

## (undated) DEVICE — BAG DRNGE C650ML H SZ 5-38 MAMM TISS EXP CNTOUR PROF NACL

## (undated) DEVICE — RELOAD STPL L60MM H1.5-3.6MM REG TISS BLU GRIPPING SURF B

## (undated) DEVICE — PACK PROCEDURE SURG LAPAROSCOPY 17X7 MM BRTRC PRIMUS

## (undated) DEVICE — SUT SLK 2-0SH 30IN BLK --

## (undated) DEVICE — AIRLIFE™ NASAL OXYGEN CANNULA CURVED, FLARED TIP WITH 14 FOOT (4.3 M) CRUSH-RESISTANT TUBING, OVER-THE-EAR STYLE: Brand: AIRLIFE™

## (undated) DEVICE — TROCAR ENDOSCP L100MM DIA12MM STBL SL BLDELSS ENDOPATH XCEL

## (undated) DEVICE — COVER,LIGHT HANDLE,FLX,1/PK: Brand: MEDLINE INDUSTRIES, INC.

## (undated) DEVICE — SYRINGE MED 25GA 3ML L5/8IN SUBQ PLAS W/ DETACH NDL SFTY

## (undated) DEVICE — SOLUTION IV 1000ML 0.9% SOD CHL

## (undated) DEVICE — STAPLE INT WHT BLU G GRN BLK REINF FOR ENDOPATH ECHELON FLX

## (undated) DEVICE — BLANKET WRM W29.9XL79.1IN UP BODY FORC AIR MISTRAL-AIR

## (undated) DEVICE — TROCAR ENDOSCP BLDELSS 12X100 MM W/ HNDL STBL SL OPT TIP

## (undated) DEVICE — SUTURE MCRYL SZ 4-0 L27IN ABSRB UD L24MM PS-1 3/8 CIR PRIM Y935H

## (undated) DEVICE — 3M™ STERI-STRIP™ COMPOUND BENZOIN TINCTURE 40 BAGS/CARTON 4 CARTONS/CASE C1544: Brand: 3M™ STERI-STRIP™

## (undated) DEVICE — BASIN EMESIS 500CC ROSE 250/CS 60/PLT: Brand: MEDEGEN MEDICAL PRODUCTS, LLC

## (undated) DEVICE — GAUZE,SPONGE,4"X4",16PLY,STRL,LF,10/TRAY: Brand: MEDLINE

## (undated) DEVICE — TROCAR LAP L100MM DIA5MM BLDELSS W/ STBL SL ENDOPATH XCEL

## (undated) DEVICE — STAPLER SKIN L440MM 32MM LNG 12 FIRING B FRM PWR + GRIPPING

## (undated) DEVICE — HANDLE PRB DIA5MM HND CTRL PSTL GRP ENDOPATH PRB + II

## (undated) DEVICE — SUTURE VCRL SZ 2-0 L54IN ABSRB VLT W/O NDL POLYGLACTIN 910 J618H

## (undated) DEVICE — RELOAD STPL L60MM H1-2.6MM MESENTERY THN TISS WHT 6 ROW

## (undated) DEVICE — ICE BG EGL STYL 9 IN BLU 12 CS

## (undated) DEVICE — GARMENT,MEDLINE,DVT,INT,CALF,MED, GEN2: Brand: MEDLINE